# Patient Record
Sex: FEMALE | Race: BLACK OR AFRICAN AMERICAN | NOT HISPANIC OR LATINO
[De-identification: names, ages, dates, MRNs, and addresses within clinical notes are randomized per-mention and may not be internally consistent; named-entity substitution may affect disease eponyms.]

---

## 2018-05-08 ENCOUNTER — RESULT REVIEW (OUTPATIENT)
Age: 68
End: 2018-05-08

## 2018-05-08 ENCOUNTER — TRANSCRIPTION ENCOUNTER (OUTPATIENT)
Age: 68
End: 2018-05-08

## 2018-05-09 ENCOUNTER — INPATIENT (INPATIENT)
Facility: HOSPITAL | Age: 68
LOS: 8 days | Discharge: HOME | End: 2018-05-18
Attending: COLON & RECTAL SURGERY | Admitting: COLON & RECTAL SURGERY
Payer: MEDICARE

## 2018-05-09 VITALS
TEMPERATURE: 98 F | DIASTOLIC BLOOD PRESSURE: 72 MMHG | HEART RATE: 100 BPM | RESPIRATION RATE: 17 BRPM | OXYGEN SATURATION: 98 % | SYSTOLIC BLOOD PRESSURE: 112 MMHG

## 2018-05-09 DIAGNOSIS — I10 ESSENTIAL (PRIMARY) HYPERTENSION: ICD-10-CM

## 2018-05-09 DIAGNOSIS — Z87.59 PERSONAL HISTORY OF OTHER COMPLICATIONS OF PREGNANCY, CHILDBIRTH AND THE PUERPERIUM: Chronic | ICD-10-CM

## 2018-05-09 DIAGNOSIS — K56.609 UNSPECIFIED INTESTINAL OBSTRUCTION, UNSPECIFIED AS TO PARTIAL VERSUS COMPLETE OBSTRUCTION: ICD-10-CM

## 2018-05-09 DIAGNOSIS — E11.9 TYPE 2 DIABETES MELLITUS WITHOUT COMPLICATIONS: ICD-10-CM

## 2018-05-09 LAB
ALBUMIN SERPL ELPH-MCNC: 3.4 G/DL — SIGNIFICANT CHANGE UP (ref 3.3–5)
ALP SERPL-CCNC: 99 U/L — SIGNIFICANT CHANGE UP (ref 40–120)
ALT FLD-CCNC: 18 U/L — SIGNIFICANT CHANGE UP (ref 12–78)
ANION GAP SERPL CALC-SCNC: 19 MMOL/L — HIGH (ref 5–17)
APPEARANCE UR: CLEAR — SIGNIFICANT CHANGE UP
APTT BLD: 23.4 SEC — LOW (ref 27.5–37.4)
AST SERPL-CCNC: <3 U/L — LOW (ref 15–37)
BACTERIA # UR AUTO: ABNORMAL
BASOPHILS # BLD AUTO: 0 K/UL — SIGNIFICANT CHANGE UP (ref 0–0.2)
BASOPHILS NFR BLD AUTO: 0 % — SIGNIFICANT CHANGE UP (ref 0–2)
BILIRUB SERPL-MCNC: 1.2 MG/DL — SIGNIFICANT CHANGE UP (ref 0.2–1.2)
BILIRUB UR-MCNC: NEGATIVE — SIGNIFICANT CHANGE UP
BLD GP AB SCN SERPL QL: SIGNIFICANT CHANGE UP
BUN SERPL-MCNC: 36 MG/DL — HIGH (ref 7–23)
CALCIUM SERPL-MCNC: 10.7 MG/DL — HIGH (ref 8.5–10.1)
CHLORIDE SERPL-SCNC: 82 MMOL/L — LOW (ref 96–108)
CO2 SERPL-SCNC: 34 MMOL/L — HIGH (ref 22–31)
COLOR SPEC: YELLOW — SIGNIFICANT CHANGE UP
CREAT SERPL-MCNC: 2.67 MG/DL — HIGH (ref 0.5–1.3)
DIFF PNL FLD: ABNORMAL
EOSINOPHIL # BLD AUTO: 0 K/UL — SIGNIFICANT CHANGE UP (ref 0–0.5)
EOSINOPHIL NFR BLD AUTO: 0 % — SIGNIFICANT CHANGE UP (ref 0–6)
EPI CELLS # UR: SIGNIFICANT CHANGE UP
GLUCOSE BLDC GLUCOMTR-MCNC: 549 MG/DL — CRITICAL HIGH (ref 70–99)
GLUCOSE BLDC GLUCOMTR-MCNC: 555 MG/DL — CRITICAL HIGH (ref 70–99)
GLUCOSE SERPL-MCNC: 636 MG/DL — CRITICAL HIGH (ref 70–99)
GLUCOSE UR QL: 1000 MG/DL
HCT VFR BLD CALC: 46.2 % — HIGH (ref 34.5–45)
HGB BLD-MCNC: 15.6 G/DL — HIGH (ref 11.5–15.5)
INR BLD: 1.05 RATIO — SIGNIFICANT CHANGE UP (ref 0.88–1.16)
KETONES UR-MCNC: NEGATIVE — SIGNIFICANT CHANGE UP
LEUKOCYTE ESTERASE UR-ACNC: NEGATIVE — SIGNIFICANT CHANGE UP
LIDOCAIN IGE QN: 144 U/L — SIGNIFICANT CHANGE UP (ref 73–393)
LYMPHOCYTES # BLD AUTO: 0.8 K/UL — LOW (ref 1–3.3)
LYMPHOCYTES # BLD AUTO: 7 % — LOW (ref 13–44)
MAGNESIUM SERPL-MCNC: 3.2 MG/DL — HIGH (ref 1.6–2.6)
MANUAL SMEAR VERIFICATION: SIGNIFICANT CHANGE UP
MCHC RBC-ENTMCNC: 29.9 PG — SIGNIFICANT CHANGE UP (ref 27–34)
MCHC RBC-ENTMCNC: 33.8 GM/DL — SIGNIFICANT CHANGE UP (ref 32–36)
MCV RBC AUTO: 88.7 FL — SIGNIFICANT CHANGE UP (ref 80–100)
MONOCYTES # BLD AUTO: 1.7 K/UL — HIGH (ref 0–0.9)
MONOCYTES NFR BLD AUTO: 15 % — HIGH (ref 2–14)
NEUTROPHILS # BLD AUTO: 8.86 K/UL — HIGH (ref 1.8–7.4)
NEUTROPHILS NFR BLD AUTO: 73 % — SIGNIFICANT CHANGE UP (ref 43–77)
NEUTS BAND # BLD: 5 % — SIGNIFICANT CHANGE UP (ref 0–8)
NITRITE UR-MCNC: NEGATIVE — SIGNIFICANT CHANGE UP
NRBC # BLD: 0 /100 — SIGNIFICANT CHANGE UP (ref 0–0)
NRBC # BLD: SIGNIFICANT CHANGE UP /100 WBCS (ref 0–0)
PH UR: 5 — SIGNIFICANT CHANGE UP (ref 5–8)
PLAT MORPH BLD: NORMAL — SIGNIFICANT CHANGE UP
PLATELET # BLD AUTO: 319 K/UL — SIGNIFICANT CHANGE UP (ref 150–400)
POTASSIUM SERPL-MCNC: 2.6 MMOL/L — CRITICAL LOW (ref 3.5–5.3)
POTASSIUM SERPL-SCNC: 2.6 MMOL/L — CRITICAL LOW (ref 3.5–5.3)
PROT SERPL-MCNC: 8.2 GM/DL — SIGNIFICANT CHANGE UP (ref 6–8.3)
PROT UR-MCNC: NEGATIVE MG/DL — SIGNIFICANT CHANGE UP
PROTHROM AB SERPL-ACNC: 11.5 SEC — SIGNIFICANT CHANGE UP (ref 9.8–12.7)
RBC # BLD: 5.21 M/UL — HIGH (ref 3.8–5.2)
RBC # FLD: 13.5 % — SIGNIFICANT CHANGE UP (ref 10.3–14.5)
RBC BLD AUTO: SIGNIFICANT CHANGE UP
RBC CASTS # UR COMP ASSIST: ABNORMAL /HPF (ref 0–4)
SODIUM SERPL-SCNC: 135 MMOL/L — SIGNIFICANT CHANGE UP (ref 135–145)
SP GR SPEC: 1.01 — SIGNIFICANT CHANGE UP (ref 1.01–1.02)
TOXIC GRANULES BLD QL SMEAR: PRESENT — SIGNIFICANT CHANGE UP
UROBILINOGEN FLD QL: NEGATIVE MG/DL — SIGNIFICANT CHANGE UP
WBC # BLD: 11.36 K/UL — HIGH (ref 3.8–10.5)
WBC # FLD AUTO: 11.36 K/UL — HIGH (ref 3.8–10.5)
WBC UR QL: SIGNIFICANT CHANGE UP

## 2018-05-09 PROCEDURE — 88307 TISSUE EXAM BY PATHOLOGIST: CPT | Mod: 26

## 2018-05-09 PROCEDURE — 99291 CRITICAL CARE FIRST HOUR: CPT

## 2018-05-09 PROCEDURE — 74176 CT ABD & PELVIS W/O CONTRAST: CPT | Mod: 26

## 2018-05-09 PROCEDURE — 71045 X-RAY EXAM CHEST 1 VIEW: CPT | Mod: 26

## 2018-05-09 RX ORDER — PIPERACILLIN AND TAZOBACTAM 4; .5 G/20ML; G/20ML
3.38 INJECTION, POWDER, LYOPHILIZED, FOR SOLUTION INTRAVENOUS EVERY 8 HOURS
Qty: 0 | Refills: 0 | Status: DISCONTINUED | OUTPATIENT
Start: 2018-05-09 | End: 2018-05-11

## 2018-05-09 RX ORDER — ACETAMINOPHEN 500 MG
650 TABLET ORAL EVERY 6 HOURS
Qty: 0 | Refills: 0 | Status: DISCONTINUED | OUTPATIENT
Start: 2018-05-09 | End: 2018-05-10

## 2018-05-09 RX ORDER — SODIUM CHLORIDE 9 MG/ML
3000 INJECTION, SOLUTION INTRAVENOUS ONCE
Qty: 0 | Refills: 0 | Status: COMPLETED | OUTPATIENT
Start: 2018-05-09 | End: 2018-05-09

## 2018-05-09 RX ORDER — PIPERACILLIN AND TAZOBACTAM 4; .5 G/20ML; G/20ML
3.38 INJECTION, POWDER, LYOPHILIZED, FOR SOLUTION INTRAVENOUS ONCE
Qty: 0 | Refills: 0 | Status: COMPLETED | OUTPATIENT
Start: 2018-05-09 | End: 2018-05-09

## 2018-05-09 RX ORDER — ASPIRIN/CALCIUM CARB/MAGNESIUM 324 MG
81 TABLET ORAL DAILY
Qty: 0 | Refills: 0 | Status: DISCONTINUED | OUTPATIENT
Start: 2018-05-09 | End: 2018-05-10

## 2018-05-09 RX ORDER — DEXTROSE 50 % IN WATER 50 %
25 SYRINGE (ML) INTRAVENOUS ONCE
Qty: 0 | Refills: 0 | Status: DISCONTINUED | OUTPATIENT
Start: 2018-05-09 | End: 2018-05-11

## 2018-05-09 RX ORDER — IOHEXOL 300 MG/ML
30 INJECTION, SOLUTION INTRAVENOUS ONCE
Qty: 0 | Refills: 0 | Status: COMPLETED | OUTPATIENT
Start: 2018-05-09 | End: 2018-05-09

## 2018-05-09 RX ORDER — HYDROCHLOROTHIAZIDE 25 MG
25 TABLET ORAL DAILY
Qty: 0 | Refills: 0 | Status: DISCONTINUED | OUTPATIENT
Start: 2018-05-09 | End: 2018-05-10

## 2018-05-09 RX ORDER — DEXTROSE 50 % IN WATER 50 %
15 SYRINGE (ML) INTRAVENOUS ONCE
Qty: 0 | Refills: 0 | Status: DISCONTINUED | OUTPATIENT
Start: 2018-05-09 | End: 2018-05-11

## 2018-05-09 RX ORDER — INSULIN LISPRO 100/ML
VIAL (ML) SUBCUTANEOUS
Qty: 0 | Refills: 0 | Status: DISCONTINUED | OUTPATIENT
Start: 2018-05-09 | End: 2018-05-10

## 2018-05-09 RX ORDER — ONDANSETRON 8 MG/1
8 TABLET, FILM COATED ORAL ONCE
Qty: 0 | Refills: 0 | Status: COMPLETED | OUTPATIENT
Start: 2018-05-09 | End: 2018-05-09

## 2018-05-09 RX ORDER — MORPHINE SULFATE 50 MG/1
2 CAPSULE, EXTENDED RELEASE ORAL EVERY 4 HOURS
Qty: 0 | Refills: 0 | Status: DISCONTINUED | OUTPATIENT
Start: 2018-05-09 | End: 2018-05-10

## 2018-05-09 RX ORDER — INSULIN LISPRO 100/ML
VIAL (ML) SUBCUTANEOUS AT BEDTIME
Qty: 0 | Refills: 0 | Status: DISCONTINUED | OUTPATIENT
Start: 2018-05-09 | End: 2018-05-10

## 2018-05-09 RX ORDER — GLUCAGON INJECTION, SOLUTION 0.5 MG/.1ML
1 INJECTION, SOLUTION SUBCUTANEOUS ONCE
Qty: 0 | Refills: 0 | Status: DISCONTINUED | OUTPATIENT
Start: 2018-05-09 | End: 2018-05-11

## 2018-05-09 RX ORDER — MORPHINE SULFATE 50 MG/1
4 CAPSULE, EXTENDED RELEASE ORAL ONCE
Qty: 0 | Refills: 0 | Status: DISCONTINUED | OUTPATIENT
Start: 2018-05-09 | End: 2018-05-09

## 2018-05-09 RX ORDER — DEXTROSE 50 % IN WATER 50 %
12.5 SYRINGE (ML) INTRAVENOUS ONCE
Qty: 0 | Refills: 0 | Status: DISCONTINUED | OUTPATIENT
Start: 2018-05-09 | End: 2018-05-11

## 2018-05-09 RX ORDER — PANTOPRAZOLE SODIUM 20 MG/1
40 TABLET, DELAYED RELEASE ORAL DAILY
Qty: 0 | Refills: 0 | Status: DISCONTINUED | OUTPATIENT
Start: 2018-05-09 | End: 2018-05-11

## 2018-05-09 RX ORDER — LOSARTAN POTASSIUM 100 MG/1
100 TABLET, FILM COATED ORAL DAILY
Qty: 0 | Refills: 0 | Status: DISCONTINUED | OUTPATIENT
Start: 2018-05-09 | End: 2018-05-10

## 2018-05-09 RX ORDER — SODIUM CHLORIDE 9 MG/ML
1000 INJECTION INTRAMUSCULAR; INTRAVENOUS; SUBCUTANEOUS
Qty: 0 | Refills: 0 | Status: DISCONTINUED | OUTPATIENT
Start: 2018-05-09 | End: 2018-05-10

## 2018-05-09 RX ORDER — POTASSIUM CHLORIDE 20 MEQ
10 PACKET (EA) ORAL
Qty: 0 | Refills: 0 | Status: DISCONTINUED | OUTPATIENT
Start: 2018-05-09 | End: 2018-05-18

## 2018-05-09 RX ORDER — SODIUM CHLORIDE 9 MG/ML
1000 INJECTION, SOLUTION INTRAVENOUS
Qty: 0 | Refills: 0 | Status: DISCONTINUED | OUTPATIENT
Start: 2018-05-09 | End: 2018-05-11

## 2018-05-09 RX ORDER — HEPARIN SODIUM 5000 [USP'U]/ML
5000 INJECTION INTRAVENOUS; SUBCUTANEOUS EVERY 12 HOURS
Qty: 0 | Refills: 0 | Status: DISCONTINUED | OUTPATIENT
Start: 2018-05-09 | End: 2018-05-11

## 2018-05-09 RX ORDER — ONDANSETRON 8 MG/1
4 TABLET, FILM COATED ORAL EVERY 6 HOURS
Qty: 0 | Refills: 0 | Status: DISCONTINUED | OUTPATIENT
Start: 2018-05-09 | End: 2018-05-11

## 2018-05-09 RX ADMIN — PIPERACILLIN AND TAZOBACTAM 100 GRAM(S): 4; .5 INJECTION, POWDER, LYOPHILIZED, FOR SOLUTION INTRAVENOUS at 22:47

## 2018-05-09 RX ADMIN — Medication 100 MILLIEQUIVALENT(S): at 22:00

## 2018-05-09 RX ADMIN — ONDANSETRON 8 MILLIGRAM(S): 8 TABLET, FILM COATED ORAL at 20:28

## 2018-05-09 RX ADMIN — SODIUM CHLORIDE 3000 MILLILITER(S): 9 INJECTION, SOLUTION INTRAVENOUS at 22:16

## 2018-05-09 RX ADMIN — MORPHINE SULFATE 4 MILLIGRAM(S): 50 CAPSULE, EXTENDED RELEASE ORAL at 20:47

## 2018-05-09 RX ADMIN — IOHEXOL 30 MILLILITER(S): 300 INJECTION, SOLUTION INTRAVENOUS at 20:29

## 2018-05-09 RX ADMIN — MORPHINE SULFATE 4 MILLIGRAM(S): 50 CAPSULE, EXTENDED RELEASE ORAL at 20:29

## 2018-05-09 RX ADMIN — Medication 100 MILLIEQUIVALENT(S): at 23:14

## 2018-05-09 NOTE — H&P ADULT - NSHPPHYSICALEXAM_GEN_ALL_CORE
PHYSICAL EXAM:  GENERAL: NAD, well-developed  HEAD:  Atraumatic, Normocephalic  EYES: conjunctiva and sclera clear  ENMT: No tonsillar erythema, exudates, or enlargement; Moist mucous membranes, No lesions  NECK: Supple, No JVD, Normal thyroid  NERVOUS SYSTEM:  Alert & Oriented X3  CHEST/LUNG: Clear to auscultation bilaterally; No rales, rhonchi, wheezing  HEART: Regular rate and rhythm. S1S2  ABDOMEN: very distended, soft, hypoactive BS, diffuse tenderness  EXTREMITIES:  2+ Peripheral Pulses, No clubbing, cyanosis, or edema

## 2018-05-09 NOTE — H&P ADULT - HISTORY OF PRESENT ILLNESS
68 y/o female PMHx HTN, DM, HLD,  x 2 (32 and 27 years ago), DVT  c/o abdominal pain and vomiting x 3 days. Pain is most severe periumbilical. Multiple episodes of emesis. Last BM yesterday and normal. Passed flatus today. Patient denies fever, chills, constipation, diarrhea, hematuria, melena, hematochezia, chest pain, shortness of breath, dizziness. Patient denies prior incident.

## 2018-05-09 NOTE — H&P ADULT - PROBLEM SELECTOR PLAN 1
-Admit patient   -Emergent OR   -Pre-op labs  -NPO, NGT to LWS, IV hydration  -KUB to confirm NGT placement  -sanchez  -pain management prn  -anti-emetic prn  -GI ppx  -DVT ppx  -f/u labs  -discussed with Dr. Sequeira

## 2018-05-09 NOTE — H&P ADULT - NSHPLABSRESULTS_GEN_ALL_CORE
15.6   11.36 )-----------( 319      ( 09 May 2018 20:40 )             46.2   05-09    135  |  82<L>  |  36<H>  ----------------------------<  636<HH>  2.6<LL>   |  34<H>  |  2.67<H>    Ca    10.7<H>      09 May 2018 20:40  Mg     3.2     05-09    TPro  8.2  /  Alb  3.4  /  TBili  1.2  /  DBili  x   /  AST  <3<L>  /  ALT  18  /  AlkPhos  99  05-09    CT Abdomen and Pelvis w/ Oral Cont (05.09.18 @ 21:33)   IMPRESSION:  High-grade SBO with ischemic-looking distal small bowel loops in the   pelvis. Pneumatosis and portal venous gas confirm ischemia. No large   amount of free air. Small volume ascites. Urgent surgical consultation is   recommended.

## 2018-05-09 NOTE — ED PROVIDER NOTE - PHYSICAL EXAMINATION
Gen: Alert, NAD  Head: NC, AT   Eyes: PERRL, EOMI, normal lids/conjunctiva  ENT: normal hearing, patent oropharynx without erythema/exudate, uvula midline  Neck: supple, no tenderness, Trachea midline  Pulm: Bilateral BS, normal resp effort, no wheeze/stridor/retractions  CV: RRR, no M/R/G, 2+ radial and dp pulses bl, no edema  Abd: distended, ttp diffusely, +BS, no hepatosplenomegaly  Mskel: extremities x4 with normal ROM and no joint effusions. no ctl spine ttp.   Skin: no rash, no bruising   Neuro: AAOx3, no sensory/motor deficits, CN 2-12 intact

## 2018-05-09 NOTE — ED ADULT NURSE REASSESSMENT NOTE - NS ED NURSE REASSESS COMMENT FT1
Pt CMP drawn to confirm finger sticks that were logged in the EMR. Confirmatory specimen drawn and sent to the lab for evaluation.
Report taken from MARLA Fonseca, stated report was given to the OR and pt information was completed for transfer. Upon completion of report, the OR arrived for transport and pt report was given to [facility]. Pt transported to the floor by OR transport tech. VS recorded and surgical checklist completed. Pt transported off the floor at 2343 hours. Pt does not appear comfortable, reports a 4/10 pain and shows no signs or symptoms of distress. Pts family took all of the patients belongings.
pt awaiting to go to OR, pt and family made aware, pt NST placed by Dr. Chandler pt tolerated well. pt reports last eaten at 1500 today.

## 2018-05-09 NOTE — H&P ADULT - ASSESSMENT
66 y/o female PMHx HTN, DM, HLD,  x 2 (32 and 27 years ago), DVT 1986 c/o abdominal pain and vomiting x 3 days. Admitted with SBO, concern for ischemia.

## 2018-05-09 NOTE — ED PROVIDER NOTE - OBJECTIVE STATEMENT
Pertinent PMH/PSH/FHx/SHx and Review of Systems contained within:  67F hx htn dm hld pw 2 days abd pain, nausea, vomiting. patient notes drinking etoh 2 days ago, which she normally does not. but it was not a lot. no associated fever, dysuria. patient is passing gas and had a bm yesterday. prev surgery includes c section. patient has no cp, sob, ha, vision change, rash, bleeding. patient did not take anything for symptoms  Fh and Sh not otherwise contributory  ROS otherwise negative

## 2018-05-09 NOTE — H&P ADULT - ATTENDING COMMENTS
I have seen and examined the patient. I agree with the above surgery resident's note.  a/p high grade sbo with s/s of intestinal ishemia  -OR for emergent ex lap, possible bowel resection/stoma  -r/b/c explained to pt and family including possible need for 2nd look

## 2018-05-09 NOTE — ED PROVIDER NOTE - MEDICAL DECISION MAKING DETAILS
patient abd abd pain and distension, suspect sbo patient abd abd pain and distension, suspect sbo. CT shows high grade with sbo and ishcemic bowel. patient to go to OR tonight with dr nugent. NG tube placed. nsr rate 100bpm, no st elevation or depression, no axis deviation, no t inversion, qtc and pr wnl. patient abd abd pain and distension, suspect sbo. CT shows high grade with sbo and ishcemic bowel. patient to go to OR tonight with dr nugent. NG tube placed. nsr rate 100bpm, no st elevation or depression, no axis deviation, no t inversion, qtc and pr wnl. labs notably show hyperglycemia and hypokalemia. will bolus LR and given potassium riders. At this stage, would be dangerous to given insulin given how low the potassium is. Will need K to be in the 4s before insulin is given.

## 2018-05-09 NOTE — ED PROVIDER NOTE - CARE PLAN
Principal Discharge DX:	Small bowel obstruction  Secondary Diagnosis:	Hyperglycemia  Secondary Diagnosis:	Hypokalemia

## 2018-05-10 ENCOUNTER — TRANSCRIPTION ENCOUNTER (OUTPATIENT)
Age: 68
End: 2018-05-10

## 2018-05-10 DIAGNOSIS — I10 ESSENTIAL (PRIMARY) HYPERTENSION: ICD-10-CM

## 2018-05-10 LAB
ALBUMIN SERPL ELPH-MCNC: 2.4 G/DL — LOW (ref 3.3–5)
ALBUMIN SERPL ELPH-MCNC: 2.5 G/DL — LOW (ref 3.3–5)
ALP SERPL-CCNC: 70 U/L — SIGNIFICANT CHANGE UP (ref 40–120)
ALP SERPL-CCNC: 73 U/L — SIGNIFICANT CHANGE UP (ref 40–120)
ALT FLD-CCNC: 13 U/L — SIGNIFICANT CHANGE UP (ref 12–78)
ALT FLD-CCNC: 14 U/L — SIGNIFICANT CHANGE UP (ref 12–78)
ANION GAP SERPL CALC-SCNC: 12 MMOL/L — SIGNIFICANT CHANGE UP (ref 5–17)
ANION GAP SERPL CALC-SCNC: 14 MMOL/L — SIGNIFICANT CHANGE UP (ref 5–17)
APTT BLD: 23.6 SEC — LOW (ref 27.5–37.4)
AST SERPL-CCNC: 11 U/L — LOW (ref 15–37)
AST SERPL-CCNC: 22 U/L — SIGNIFICANT CHANGE UP (ref 15–37)
BASE EXCESS BLDA CALC-SCNC: 11.3 MMOL/L — HIGH (ref -2–2)
BILIRUB SERPL-MCNC: 0.9 MG/DL — SIGNIFICANT CHANGE UP (ref 0.2–1.2)
BILIRUB SERPL-MCNC: 1.1 MG/DL — SIGNIFICANT CHANGE UP (ref 0.2–1.2)
BLOOD GAS COMMENTS: SIGNIFICANT CHANGE UP
BLOOD GAS SOURCE: SIGNIFICANT CHANGE UP
BUN SERPL-MCNC: 31 MG/DL — HIGH (ref 7–23)
BUN SERPL-MCNC: 35 MG/DL — HIGH (ref 7–23)
CALCIUM SERPL-MCNC: 9.6 MG/DL — SIGNIFICANT CHANGE UP (ref 8.5–10.1)
CALCIUM SERPL-MCNC: 9.6 MG/DL — SIGNIFICANT CHANGE UP (ref 8.5–10.1)
CHLORIDE SERPL-SCNC: 88 MMOL/L — LOW (ref 96–108)
CHLORIDE SERPL-SCNC: 92 MMOL/L — LOW (ref 96–108)
CO2 SERPL-SCNC: 32 MMOL/L — HIGH (ref 22–31)
CO2 SERPL-SCNC: 38 MMOL/L — HIGH (ref 22–31)
CREAT SERPL-MCNC: 1.85 MG/DL — HIGH (ref 0.5–1.3)
CREAT SERPL-MCNC: 1.97 MG/DL — HIGH (ref 0.5–1.3)
GLUCOSE BLDC GLUCOMTR-MCNC: 155 MG/DL — HIGH (ref 70–99)
GLUCOSE BLDC GLUCOMTR-MCNC: 172 MG/DL — HIGH (ref 70–99)
GLUCOSE BLDC GLUCOMTR-MCNC: 191 MG/DL — HIGH (ref 70–99)
GLUCOSE BLDC GLUCOMTR-MCNC: 218 MG/DL — HIGH (ref 70–99)
GLUCOSE BLDC GLUCOMTR-MCNC: 342 MG/DL — HIGH (ref 70–99)
GLUCOSE BLDC GLUCOMTR-MCNC: 451 MG/DL — CRITICAL HIGH (ref 70–99)
GLUCOSE BLDC GLUCOMTR-MCNC: 452 MG/DL — CRITICAL HIGH (ref 70–99)
GLUCOSE BLDC GLUCOMTR-MCNC: 454 MG/DL — CRITICAL HIGH (ref 70–99)
GLUCOSE SERPL-MCNC: 431 MG/DL — HIGH (ref 70–99)
GLUCOSE SERPL-MCNC: 556 MG/DL — CRITICAL HIGH (ref 70–99)
HBA1C BLD-MCNC: 9.8 % — HIGH (ref 4–5.6)
HCO3 BLDA-SCNC: 35 MMOL/L — HIGH (ref 21–29)
HCT VFR BLD CALC: 46.7 % — HIGH (ref 34.5–45)
HGB BLD-MCNC: 15.7 G/DL — HIGH (ref 11.5–15.5)
HOROWITZ INDEX BLDA+IHG-RTO: 90 — SIGNIFICANT CHANGE UP
INR BLD: 1.04 RATIO — SIGNIFICANT CHANGE UP (ref 0.88–1.16)
MAGNESIUM SERPL-MCNC: 3 MG/DL — HIGH (ref 1.6–2.6)
MCHC RBC-ENTMCNC: 30.1 PG — SIGNIFICANT CHANGE UP (ref 27–34)
MCHC RBC-ENTMCNC: 33.6 GM/DL — SIGNIFICANT CHANGE UP (ref 32–36)
MCV RBC AUTO: 89.6 FL — SIGNIFICANT CHANGE UP (ref 80–100)
NRBC # BLD: 0 /100 WBCS — SIGNIFICANT CHANGE UP (ref 0–0)
PCO2 BLDA: 43 MMHG — SIGNIFICANT CHANGE UP (ref 32–46)
PH BLD: 7.53 — HIGH (ref 7.35–7.45)
PHOSPHATE SERPL-MCNC: 3.6 MG/DL — SIGNIFICANT CHANGE UP (ref 2.5–4.5)
PLATELET # BLD AUTO: 227 K/UL — SIGNIFICANT CHANGE UP (ref 150–400)
PO2 BLDA: 368 MMHG — HIGH (ref 74–108)
POTASSIUM SERPL-MCNC: 3.3 MMOL/L — LOW (ref 3.5–5.3)
POTASSIUM SERPL-MCNC: 3.4 MMOL/L — LOW (ref 3.5–5.3)
POTASSIUM SERPL-SCNC: 3.3 MMOL/L — LOW (ref 3.5–5.3)
POTASSIUM SERPL-SCNC: 3.4 MMOL/L — LOW (ref 3.5–5.3)
PROT SERPL-MCNC: 6.2 GM/DL — SIGNIFICANT CHANGE UP (ref 6–8.3)
PROT SERPL-MCNC: 6.2 GM/DL — SIGNIFICANT CHANGE UP (ref 6–8.3)
PROTHROM AB SERPL-ACNC: 11.4 SEC — SIGNIFICANT CHANGE UP (ref 9.8–12.7)
RBC # BLD: 5.21 M/UL — HIGH (ref 3.8–5.2)
RBC # FLD: 13.4 % — SIGNIFICANT CHANGE UP (ref 10.3–14.5)
SAO2 % BLDA: 99 % — HIGH (ref 92–96)
SODIUM SERPL-SCNC: 138 MMOL/L — SIGNIFICANT CHANGE UP (ref 135–145)
SODIUM SERPL-SCNC: 138 MMOL/L — SIGNIFICANT CHANGE UP (ref 135–145)
WBC # BLD: 7.7 K/UL — SIGNIFICANT CHANGE UP (ref 3.8–10.5)
WBC # FLD AUTO: 7.7 K/UL — SIGNIFICANT CHANGE UP (ref 3.8–10.5)

## 2018-05-10 PROCEDURE — 93010 ELECTROCARDIOGRAM REPORT: CPT

## 2018-05-10 PROCEDURE — 71045 X-RAY EXAM CHEST 1 VIEW: CPT | Mod: 26

## 2018-05-10 PROCEDURE — 44120 REMOVAL OF SMALL INTESTINE: CPT | Mod: AS

## 2018-05-10 RX ORDER — INSULIN LISPRO 100/ML
VIAL (ML) SUBCUTANEOUS EVERY 4 HOURS
Qty: 0 | Refills: 0 | Status: DISCONTINUED | OUTPATIENT
Start: 2018-05-10 | End: 2018-05-10

## 2018-05-10 RX ORDER — INSULIN HUMAN 100 [IU]/ML
8 INJECTION, SOLUTION SUBCUTANEOUS
Qty: 100 | Refills: 0 | Status: DISCONTINUED | OUTPATIENT
Start: 2018-05-10 | End: 2018-05-11

## 2018-05-10 RX ORDER — POTASSIUM CHLORIDE 20 MEQ
10 PACKET (EA) ORAL ONCE
Qty: 0 | Refills: 0 | Status: COMPLETED | OUTPATIENT
Start: 2018-05-10 | End: 2018-05-10

## 2018-05-10 RX ORDER — SODIUM CHLORIDE 9 MG/ML
1000 INJECTION, SOLUTION INTRAVENOUS
Qty: 0 | Refills: 0 | Status: DISCONTINUED | OUTPATIENT
Start: 2018-05-10 | End: 2018-05-11

## 2018-05-10 RX ORDER — INSULIN GLARGINE 100 [IU]/ML
10 INJECTION, SOLUTION SUBCUTANEOUS ONCE
Qty: 0 | Refills: 0 | Status: COMPLETED | OUTPATIENT
Start: 2018-05-10 | End: 2018-05-10

## 2018-05-10 RX ORDER — NOREPINEPHRINE BITARTRATE/D5W 8 MG/250ML
0.05 PLASTIC BAG, INJECTION (ML) INTRAVENOUS
Qty: 8 | Refills: 0 | Status: DISCONTINUED | OUTPATIENT
Start: 2018-05-10 | End: 2018-05-11

## 2018-05-10 RX ORDER — SODIUM CHLORIDE 9 MG/ML
1000 INJECTION, SOLUTION INTRAVENOUS
Qty: 0 | Refills: 0 | Status: DISCONTINUED | OUTPATIENT
Start: 2018-05-10 | End: 2018-05-10

## 2018-05-10 RX ORDER — FENTANYL CITRATE 50 UG/ML
50 INJECTION INTRAVENOUS ONCE
Qty: 0 | Refills: 0 | Status: DISCONTINUED | OUTPATIENT
Start: 2018-05-10 | End: 2018-05-10

## 2018-05-10 RX ORDER — PROPOFOL 10 MG/ML
5 INJECTION, EMULSION INTRAVENOUS
Qty: 1000 | Refills: 0 | Status: DISCONTINUED | OUTPATIENT
Start: 2018-05-10 | End: 2018-05-11

## 2018-05-10 RX ORDER — SODIUM CHLORIDE 9 MG/ML
1000 INJECTION, SOLUTION INTRAVENOUS ONCE
Qty: 0 | Refills: 0 | Status: COMPLETED | OUTPATIENT
Start: 2018-05-10 | End: 2018-05-10

## 2018-05-10 RX ORDER — INSULIN LISPRO 100/ML
VIAL (ML) SUBCUTANEOUS EVERY 6 HOURS
Qty: 0 | Refills: 0 | Status: DISCONTINUED | OUTPATIENT
Start: 2018-05-10 | End: 2018-05-10

## 2018-05-10 RX ORDER — FENTANYL CITRATE 50 UG/ML
0.5 INJECTION INTRAVENOUS
Qty: 2500 | Refills: 0 | Status: DISCONTINUED | OUTPATIENT
Start: 2018-05-10 | End: 2018-05-11

## 2018-05-10 RX ADMIN — PANTOPRAZOLE SODIUM 40 MILLIGRAM(S): 20 TABLET, DELAYED RELEASE ORAL at 12:55

## 2018-05-10 RX ADMIN — HEPARIN SODIUM 5000 UNIT(S): 5000 INJECTION INTRAVENOUS; SUBCUTANEOUS at 06:32

## 2018-05-10 RX ADMIN — Medication 100 MILLIEQUIVALENT(S): at 08:15

## 2018-05-10 RX ADMIN — SODIUM CHLORIDE 1000 MILLILITER(S): 9 INJECTION, SOLUTION INTRAVENOUS at 03:19

## 2018-05-10 RX ADMIN — PIPERACILLIN AND TAZOBACTAM 25 GRAM(S): 4; .5 INJECTION, POWDER, LYOPHILIZED, FOR SOLUTION INTRAVENOUS at 06:32

## 2018-05-10 RX ADMIN — FENTANYL CITRATE 50 MICROGRAM(S): 50 INJECTION INTRAVENOUS at 03:20

## 2018-05-10 RX ADMIN — PROPOFOL 2.07 MICROGRAM(S)/KG/MIN: 10 INJECTION, EMULSION INTRAVENOUS at 04:24

## 2018-05-10 RX ADMIN — FENTANYL CITRATE 1.72 MICROGRAM(S)/KG/HR: 50 INJECTION INTRAVENOUS at 04:24

## 2018-05-10 RX ADMIN — Medication 6.46 MICROGRAM(S)/KG/MIN: at 04:23

## 2018-05-10 RX ADMIN — PIPERACILLIN AND TAZOBACTAM 25 GRAM(S): 4; .5 INJECTION, POWDER, LYOPHILIZED, FOR SOLUTION INTRAVENOUS at 13:19

## 2018-05-10 RX ADMIN — PROPOFOL 2.07 MICROGRAM(S)/KG/MIN: 10 INJECTION, EMULSION INTRAVENOUS at 13:46

## 2018-05-10 RX ADMIN — Medication 6: at 06:32

## 2018-05-10 RX ADMIN — Medication 6.46 MICROGRAM(S)/KG/MIN: at 15:33

## 2018-05-10 RX ADMIN — INSULIN GLARGINE 10 UNIT(S): 100 INJECTION, SOLUTION SUBCUTANEOUS at 04:08

## 2018-05-10 RX ADMIN — PROPOFOL 2.07 MICROGRAM(S)/KG/MIN: 10 INJECTION, EMULSION INTRAVENOUS at 03:19

## 2018-05-10 RX ADMIN — INSULIN HUMAN 8 UNIT(S)/HR: 100 INJECTION, SOLUTION SUBCUTANEOUS at 12:36

## 2018-05-10 RX ADMIN — SODIUM CHLORIDE 100 MILLILITER(S): 9 INJECTION, SOLUTION INTRAVENOUS at 21:20

## 2018-05-10 RX ADMIN — SODIUM CHLORIDE 100 MILLILITER(S): 9 INJECTION, SOLUTION INTRAVENOUS at 15:29

## 2018-05-10 RX ADMIN — PIPERACILLIN AND TAZOBACTAM 25 GRAM(S): 4; .5 INJECTION, POWDER, LYOPHILIZED, FOR SOLUTION INTRAVENOUS at 21:20

## 2018-05-10 NOTE — PROGRESS NOTE ADULT - SUBJECTIVE AND OBJECTIVE BOX
INTERVAL HPI/OVERNIGHT EVENTS:      66 y/o female PMHx HTN, DM, HLD,  x 2 (32 and 27 years ago), DVT  c/o abdominal pain and vomiting x 3 days found to hae small bowel obstruction now s/p OR with ex-lap and sbr left in discontinuity with abthera vac.      24 hour events: Hyperglycemic overnight; started on insulin drip.      CENTRAL LINE: [ ] YES [x ] NO  LOCATION:   DATE INSERTED:  REMOVE: [ ] YES [ ] NO  EXPLAIN:    ROSALES: [x ] YES [ ] NO    DATE INSERTED:  REMOVE:  [ ] YES [ ] NO  EXPLAIN:    A-LINE:  [ ] YES [x ] NO  LOCATION:   DATE INSERTED:  REMOVE:  [ ] YES [ ] NO  EXPLAIN:    REVIEW OF SYSTEMS: [x] Unable to obtain because intubated and sedated; responds to simple commands.        ICU Vital Signs Last 24 Hrs  T(C): 37.1 (10 May 2018 15:42), Max: 37.3 (10 May 2018 13:00)  T(F): 98.8 (10 May 2018 15:42), Max: 99.1 (10 May 2018 13:00)  HR: 68 (10 May 2018 17:00) (68 - 111)  BP: 89/59 (10 May 2018 16:30) (66/44 - 130/109)  BP(mean): 69 (10 May 2018 16:30) (53 - 116)  ABP: --  ABP(mean): --  RR: 14 (10 May 2018 17:00) (12 - 18)  SpO2: 100% (10 May 2018 17:00) (93% - 100%)      ABG - ( 10 May 2018 02:46 )  pH, Arterial: x     pH, Blood: 7.53  /  pCO2: 43    /  pO2: 368   / HCO3: 35    / Base Excess: 11.3  /  SaO2: 99                  I&O's Detail    09 May 2018 07:01  -  10 May 2018 07:00  --------------------------------------------------------  IN:    fentaNYL Infusion.: 7.6 mL    lactated ringers.: 100 mL    norepinephrine Infusion: 12.9 mL    propofol Infusion: 6.2 mL  Total IN: 126.7 mL    OUT:    Voided: 30 mL  Total OUT: 30 mL    Total NET: 96.7 mL      10 May 2018 07:01  -  10 May 2018 18:06  --------------------------------------------------------  IN:    fentaNYL Infusion.: 68.9 mL    insulin Infusion: 40 mL    IV PiggyBack: 200 mL    lactated ringers.: 1000 mL    norepinephrine Infusion: 212.8 mL    propofol Infusion: 77.2 mL  Total IN: 1598.9 mL    OUT:    Indwelling Catheter - Urethral: 350 mL    VAC (Vacuum Assisted Closure) System: 480 mL  Total OUT: 830 mL    Total NET: 768.9 mL          Mode: AC/ CMV (Assist Control/ Continuous Mandatory Ventilation)  RR (machine): 12  TV (machine): 450  FiO2: 30  PEEP: 5  ITime: 1  MAP: 8  PIP: 20    CAPILLARY BLOOD GLUCOSE      POCT Blood Glucose.: 218 mg/dL (10 May 2018 16:54)  POCT Blood Glucose.: 342 mg/dL (10 May 2018 14:34)  POCT Blood Glucose.: 451 mg/dL (10 May 2018 06:30)  POCT Blood Glucose.: 452 mg/dL (10 May 2018 04:06)  POCT Blood Glucose.: 454 mg/dL (10 May 2018 02:25)  POCT Blood Glucose.: 555 mg/dL (09 May 2018 23:14)  POCT Blood Glucose.: 549 mg/dL (09 May 2018 23:13)      MEDICATIONS  NEURO  Meds: fentaNYL   Infusion. 0.5 MICROgram(s)/kG/Hr (1.723 mL/Hr) IV Continuous <Continuous>  ondansetron Injectable 4 milliGRAM(s) IV Push every 6 hours PRN Nausea  propofol Infusion 5 MICROgram(s)/kG/Min (2.067 mL/Hr) IV Continuous <Continuous>    RESPIRATORY  ABG - ( 10 May 2018 02:46 )  pH: x     /  pCO2: 43    /  pO2: 368   / HCO3: 35    / Base Excess: 11.3  /  SaO2: 99      Lactate: x                Meds:   CARDIOVASCULAR  Meds: norepinephrine Infusion 0.05 MICROgram(s)/kG/Min (6.459 mL/Hr) IV Continuous <Continuous>    GI/NUTRITION  Meds: pantoprazole  Injectable 40 milliGRAM(s) IV Push daily    GENITOURINARY  Meds: dextrose 5%. 1000 milliLiter(s) IV Continuous <Continuous>  lactated ringers. 1000 milliLiter(s) IV Continuous <Continuous>  potassium chloride  10 mEq/100 mL IVPB 10 milliEquivalent(s) IV Intermittent every 1 hour    HEMATOLOGIC  Meds: heparin  Injectable 5000 Unit(s) SubCutaneous every 12 hours    [x] VTE Prophylaxis  INFECTIOUS DISEASES  Meds: piperacillin/tazobactam IVPB. 3.375 Gram(s) IV Intermittent every 8 hours    ENDOCRINE  CAPILLARY BLOOD GLUCOSE      POCT Blood Glucose.: 218 mg/dL (10 May 2018 16:54)  POCT Blood Glucose.: 342 mg/dL (10 May 2018 14:34)  POCT Blood Glucose.: 451 mg/dL (10 May 2018 06:30)  POCT Blood Glucose.: 452 mg/dL (10 May 2018 04:06)  POCT Blood Glucose.: 454 mg/dL (10 May 2018 02:25)  POCT Blood Glucose.: 555 mg/dL (09 May 2018 23:14)  POCT Blood Glucose.: 549 mg/dL (09 May 2018 23:13)    Meds: dextrose 40% Gel 15 Gram(s) Oral once PRN  dextrose 50% Injectable 12.5 Gram(s) IV Push once  dextrose 50% Injectable 25 Gram(s) IV Push once  dextrose 50% Injectable 25 Gram(s) IV Push once  glucagon  Injectable 1 milliGRAM(s) IntraMuscular once PRN  insulin Infusion 8 Unit(s)/Hr IV Continuous <Continuous>    OTHER MEDICATIONS:  :    PHYSICAL EXAM:    GENERAL: NAD, well-groomed, well-developed; intubated and sedated  NECK: Supple, No JVD, Normal thyroid  CHEST/LUNG: Clear to auscultation bilaterally; No rales, rhonchi, wheezing, or rubs  HEART: Regular rate and rhythm; No murmurs, rubs, or gallops  ABDOMEN: Soft, Nontender, vac in place.    EXTREMITIES:  2+ Peripheral Pulses, No clubbing, cyanosis, or edema  SKIN: No rashes or lesions  NERVOUS SYSTEM:  Alert & Oriented X3, following simple commands.      LABS:                        15.7   7.70  )-----------( 227      ( 10 May 2018 04:30 )             46.7      05-10    138  |  92<L>  |  31<H>  ----------------------------<  431<H>  3.4<L>   |  32<H>  |  1.85<H>    Ca    9.6      10 May 2018 04:30  Phos  3.6     05-10  Mg     3.0     05-10    TPro  6.2  /  Alb  2.4<L>  /  TBili  0.9  /  DBili  x   /  AST  22  /  ALT  14  /  AlkPhos  73  05-10    PT/INR - ( 10 May 2018 04:24 )   PT: 11.4 sec;   INR: 1.04 ratio         PTT - ( 10 May 2018 04:24 )  PTT:23.6 sec  Urinalysis Basic - ( 09 May 2018 20:40 )    Color: Yellow / Appearance: Clear / S.010 / pH: x  Gluc: x / Ketone: Negative  / Bili: Negative / Urobili: Negative mg/dL   Blood: x / Protein: Negative mg/dL / Nitrite: Negative   Leuk Esterase: Negative / RBC: 3-5 /HPF / WBC 0-2   Sq Epi: x / Non Sq Epi: Few / Bacteria: Occasional

## 2018-05-10 NOTE — PROVIDER CONTACT NOTE (EICU) - RECOMMENDATIONS
-- vent management and weaning  -- hemodynamic support, continue fluid resuscitation.   -- glucose management consider insulin gtt  -- antibiotics for GI coverage  -- ICU monitoring.    Case discussed with PA.

## 2018-05-10 NOTE — PROVIDER CONTACT NOTE (EICU) - ASSESSMENT
Problems  # s/p Exlap, lysis of adhesion small bowel resection  - SBO/small bowel ischemia   # Hyperglycemia  # post op resp failure

## 2018-05-10 NOTE — BRIEF OPERATIVE NOTE - PROCEDURE
<<-----Click on this checkbox to enter Procedure Vacuum assisted closure therapy  05/10/2018    Active  RDRING  Small bowel resection  05/10/2018    Active  RDRING  Lysis of adhesions for small bowel obstruction  05/10/2018    Active  RDRING

## 2018-05-10 NOTE — PROVIDER CONTACT NOTE (EICU) - BACKGROUND
66yo admitted with abdominal pain and distention on CT found to have high grade SBO.  Went to OR emergently for Ex lap - lysis of adhesion.  Intra-op found to have closed loop adhesion SBO with ischemic bowel s/p resection.  minimal blood loss, given approximately 5 Liters of IVF.  Of note pt had several lab dergangements including VJ, hyperglycemia with glucosuria on presentation.

## 2018-05-10 NOTE — CONSULT NOTE ADULT - ASSESSMENT
66 Y/O female admitted w/  abdominal pain, vomiting, s/p ex lap, remained intubated post operatively, transferred to ICU for further management.

## 2018-05-10 NOTE — CONSULT NOTE ADULT - PROBLEM SELECTOR RECOMMENDATION 9
1. transfer to ICU under Dr. Scott  2. ABG, cxr, make vent changes accordingly  3. repeat labs, replace electrolytes as needed  4. maintain wound vac as per surgical recommendations   5. monitor H/H  6. sedation vacation as tolerated

## 2018-05-10 NOTE — CONSULT NOTE ADULT - SUBJECTIVE AND OBJECTIVE BOX
Patient is a 67y old  Female who presents with a chief complaint of Abdominal pain, vomiting, s/p ex lap, remained intubated post operatively, transferred to ICU for further management.     HPI:  66 y/o female PMHx HTN, DM, HLD,  x 2 (32 and 27 years ago), DVT 1986 c/o abdominal pain and vomiting x 3 days. Pain is most severe periumbilical. Multiple episodes of emesis. Last BM yesterday and normal. Passed flatus today. Patient denies fever, chills, constipation, diarrhea, hematuria, melena, hematochezia, chest pain, shortness of breath, dizziness. Patient denies prior incident. (09 May 2018 22:59)      Allergies    avocado (Unknown)  No Known Drug Allergies    MEDICATIONS  (STANDING):  aspirin  chewable 81 milliGRAM(s) Oral daily  dextrose 5%. 1000 milliLiter(s) (50 mL/Hr) IV Continuous <Continuous>  dextrose 50% Injectable 12.5 Gram(s) IV Push once  dextrose 50% Injectable 25 Gram(s) IV Push once  dextrose 50% Injectable 25 Gram(s) IV Push once  heparin  Injectable 5000 Unit(s) SubCutaneous every 12 hours  insulin lispro (HumaLOG) corrective regimen sliding scale   SubCutaneous every 6 hours  lactated ringers. 1000 milliLiter(s) (100 mL/Hr) IV Continuous <Continuous>  pantoprazole  Injectable 40 milliGRAM(s) IV Push daily  piperacillin/tazobactam IVPB. 3.375 Gram(s) IV Intermittent every 8 hours  potassium chloride  10 mEq/100 mL IVPB 10 milliEquivalent(s) IV Intermittent every 1 hour  propofol Infusion 5 MICROgram(s)/kG/Min (2.067 mL/Hr) IV Continuous <Continuous>    MEDICATIONS  (PRN):  dextrose 40% Gel 15 Gram(s) Oral once PRN Blood Glucose LESS THAN 70 milliGRAM(s)/deciliter  glucagon  Injectable 1 milliGRAM(s) IntraMuscular once PRN Glucose LESS THAN 70 milligrams/deciliter  morphine  - Injectable 2 milliGRAM(s) IV Push every 4 hours PRN Severe Pain (7 - 10)  ondansetron Injectable 4 milliGRAM(s) IV Push every 6 hours PRN Nausea      Daily Height in cm: 160.02 (09 May 2018 23:34)    Daily     Drug Dosing Weight  Height (cm): 160.02 (09 May 2018 23:34)  Weight (kg): 68.9 (09 May 2018 23:34)  BMI (kg/m2): 26.9 (09 May 2018 23:34)  BSA (m2): 1.72 (09 May 2018 23:34)    PAST MEDICAL & SURGICAL HISTORY:  DM type 2 (diabetes mellitus, type 2)  HTN (hypertension)  History of 2  sections      FAMILY HISTORY:  No pertinent family history in first degree relatives      SOCIAL HISTORY:    ADVANCE DIRECTIVES: [ ] Full Code [ ] DNR    REVIEW OF SYSTEMS:  Pt unable to provide 2/2 to intubation and sedation      ICU Vital Signs Last 24 Hrs  T(C): 36.7 (09 May 2018 23:34), Max: 36.9 (09 May 2018 20:11)  T(F): 98.1 (09 May 2018 23:34), Max: 98.4 (09 May 2018 20:11)  HR: 85 (09 May 2018 23:34) (85 - 111)  BP: 130/54 (09 May 2018 23:34) (112/72 - 130/54)  BP(mean): --  ABP: --  ABP(mean): --  RR: 17 (09 May 2018 23:34) (17 - 18)  SpO2: 97% (09 May 2018 23:34) (93% - 98%)      PHYSICAL EXAM:    GENERAL: intubated and sedted  HEAD:  Atraumatic, Normocephalic  EYES: EOMI, PERRLA, conjunctiva and sclera clear  NECK: Supple, No JVD, Normal thyroid  NERVOUS SYSTEM:  sedated, responds to painful stimuly  CHEST/LUNG: + breath sounds bilaterally; No rales, rhonchi, wheezing  HEART: Regular rate and rhythm; No murmurs, rubs, or gallops  ABDOMEN: Soft, + wound vac, obese, bowel sounds absent  EXTREMITIES:  2+ Peripheral Pulses, No clubbing, cyanosis, or edema    LABS:  CBC Full  -  ( 09 May 2018 20:40 )  WBC Count : 11.36 K/uL  Hemoglobin : 15.6 g/dL  Hematocrit : 46.2 %  Platelet Count - Automated : 319 K/uL  Mean Cell Volume : 88.7 fl  Mean Cell Hemoglobin : 29.9 pg  Mean Cell Hemoglobin Concentration : 33.8 gm/dL  Auto Neutrophil # : 8.86 K/uL  Auto Lymphocyte # : 0.80 K/uL  Auto Monocyte # : 1.70 K/uL  Auto Eosinophil # : 0.00 K/uL  Auto Basophil # : 0.00 K/uL  Auto Neutrophil % : 73.0 %  Auto Lymphocyte % : 7.0 %  Auto Monocyte % : 15.0 %  Auto Eosinophil % : 0.0 %  Auto Basophil % : 0.0 %        138  |  88<L>  |  35<H>  ----------------------------<  x   3.3<L>   |  38<H>  |  1.97<H>    Ca    9.6      09 May 2018 23:37  Mg     3.2         TPro  6.2  /  Alb  2.5<L>  /  TBili  1.1  /  DBili  x   /  AST  11<L>  /  ALT  13  /  AlkPhos  70      CAPILLARY BLOOD GLUCOSE      POCT Blood Glucose.: 555 mg/dL (09 May 2018 23:14)    PT/INR - ( 09 May 2018 20:40 )   PT: 11.5 sec;   INR: 1.05 ratio         PTT - ( 09 May 2018 20:40 )  PTT:23.4 sec  Urinalysis Basic - ( 09 May 2018 20:40 )    Color: Yellow / Appearance: Clear / S.010 / pH: x  Gluc: x / Ketone: Negative  / Bili: Negative / Urobili: Negative mg/dL   Blood: x / Protein: Negative mg/dL / Nitrite: Negative   Leuk Esterase: Negative / RBC: 3-5 /HPF / WBC 0-2   Sq Epi: x / Non Sq Epi: Few / Bacteria: Occasional                  CRITICAL CARE TIME SPENT: 60 min

## 2018-05-10 NOTE — PROGRESS NOTE ADULT - SUBJECTIVE AND OBJECTIVE BOX
Post-op check    S/P Ex lap, ELAYNE, SBR, abthera vac placement POD#0  67 year old Female seen and examined at bedside with family present. Patient is intubated, alert, and able to answer questions by nodding head. Admits to abdominal pain well controlled with medication. Admits to NGT discomfort. Denies chest pain, shortness of breath, nausea/ vomiting, and dizziness.     Vital Signs Last 24 Hrs  T(F): 98.1 (18 @ 23:34), Max: 98.4 (18 @ 20:11)  HR: 77 (05-10-18 @ 02:23)  BP: 130/54 (18 @ 23:34)  RR: 17 (18 @ 23:34)  SpO2: 100% (05-10-18 @ 02:23)    GENERAL: Alert, NAD, intubated, NGT  CHEST/LUNG: Clear to auscultation bilaterally, respirations nonlabored  HEART: S1S2, Regular rate and rhythm  ABDOMEN: Abthera vac intact and functioning well with good seal. -bowel sounds, soft, Appropriate incisional tenderness, softly distended  : sanchez indwelling with clear yellow urine  EXTREMITIES:  no calf tenderness, No edema, intermittent compression devices in place bilaterally    Assessment: 68 y/o female PMHx HTN, DM, HLD,  x 2 (32 and 27 years ago), DVT 1986 with closed loop SBO S/P Ex lap, ELAYNE, SBR, abthera vac placement POD#0    Plan:   - Return to OR planning in 24 hours  - local wound care with abthera vac  - NPO, NGT, IVF, sanchez  - DVT prophylaxis, pain control  - Continue IV antibiotics   - continue current management per ICU  - will discuss with surgical attending

## 2018-05-11 LAB
ANION GAP SERPL CALC-SCNC: 6 MMOL/L — SIGNIFICANT CHANGE UP (ref 5–17)
ANION GAP SERPL CALC-SCNC: 8 MMOL/L — SIGNIFICANT CHANGE UP (ref 5–17)
APTT BLD: 23.3 SEC — LOW (ref 27.5–37.4)
BLD GP AB SCN SERPL QL: SIGNIFICANT CHANGE UP
BUN SERPL-MCNC: 18 MG/DL — SIGNIFICANT CHANGE UP (ref 7–23)
BUN SERPL-MCNC: 23 MG/DL — SIGNIFICANT CHANGE UP (ref 7–23)
CALCIUM SERPL-MCNC: 8.2 MG/DL — LOW (ref 8.5–10.1)
CALCIUM SERPL-MCNC: 8.6 MG/DL — SIGNIFICANT CHANGE UP (ref 8.5–10.1)
CHLORIDE SERPL-SCNC: 106 MMOL/L — SIGNIFICANT CHANGE UP (ref 96–108)
CHLORIDE SERPL-SCNC: 109 MMOL/L — HIGH (ref 96–108)
CO2 SERPL-SCNC: 33 MMOL/L — HIGH (ref 22–31)
CO2 SERPL-SCNC: 34 MMOL/L — HIGH (ref 22–31)
CREAT SERPL-MCNC: 0.99 MG/DL — SIGNIFICANT CHANGE UP (ref 0.5–1.3)
CREAT SERPL-MCNC: 1.26 MG/DL — SIGNIFICANT CHANGE UP (ref 0.5–1.3)
CULTURE RESULTS: SIGNIFICANT CHANGE UP
GLUCOSE BLDC GLUCOMTR-MCNC: 132 MG/DL — HIGH (ref 70–99)
GLUCOSE BLDC GLUCOMTR-MCNC: 136 MG/DL — HIGH (ref 70–99)
GLUCOSE BLDC GLUCOMTR-MCNC: 141 MG/DL — HIGH (ref 70–99)
GLUCOSE BLDC GLUCOMTR-MCNC: 150 MG/DL — HIGH (ref 70–99)
GLUCOSE BLDC GLUCOMTR-MCNC: 152 MG/DL — HIGH (ref 70–99)
GLUCOSE BLDC GLUCOMTR-MCNC: 154 MG/DL — HIGH (ref 70–99)
GLUCOSE BLDC GLUCOMTR-MCNC: 161 MG/DL — HIGH (ref 70–99)
GLUCOSE BLDC GLUCOMTR-MCNC: 167 MG/DL — HIGH (ref 70–99)
GLUCOSE BLDC GLUCOMTR-MCNC: 99 MG/DL — SIGNIFICANT CHANGE UP (ref 70–99)
GLUCOSE SERPL-MCNC: 128 MG/DL — HIGH (ref 70–99)
GLUCOSE SERPL-MCNC: 185 MG/DL — HIGH (ref 70–99)
HCT VFR BLD CALC: 36 % — SIGNIFICANT CHANGE UP (ref 34.5–45)
HGB BLD-MCNC: 12.5 G/DL — SIGNIFICANT CHANGE UP (ref 11.5–15.5)
INR BLD: 0.94 RATIO — SIGNIFICANT CHANGE UP (ref 0.88–1.16)
MAGNESIUM SERPL-MCNC: 2.5 MG/DL — SIGNIFICANT CHANGE UP (ref 1.6–2.6)
MAGNESIUM SERPL-MCNC: 2.7 MG/DL — HIGH (ref 1.6–2.6)
MCHC RBC-ENTMCNC: 31 PG — SIGNIFICANT CHANGE UP (ref 27–34)
MCHC RBC-ENTMCNC: 34.7 GM/DL — SIGNIFICANT CHANGE UP (ref 32–36)
MCV RBC AUTO: 89.3 FL — SIGNIFICANT CHANGE UP (ref 80–100)
NRBC # BLD: 0 /100 WBCS — SIGNIFICANT CHANGE UP (ref 0–0)
PHOSPHATE SERPL-MCNC: 1.7 MG/DL — LOW (ref 2.5–4.5)
PHOSPHATE SERPL-MCNC: 2.3 MG/DL — LOW (ref 2.5–4.5)
PLATELET # BLD AUTO: 278 K/UL — SIGNIFICANT CHANGE UP (ref 150–400)
POTASSIUM SERPL-MCNC: 3 MMOL/L — LOW (ref 3.5–5.3)
POTASSIUM SERPL-MCNC: 3.2 MMOL/L — LOW (ref 3.5–5.3)
POTASSIUM SERPL-SCNC: 3 MMOL/L — LOW (ref 3.5–5.3)
POTASSIUM SERPL-SCNC: 3.2 MMOL/L — LOW (ref 3.5–5.3)
PROTHROM AB SERPL-ACNC: 10.2 SEC — SIGNIFICANT CHANGE UP (ref 9.8–12.7)
RBC # BLD: 4.03 M/UL — SIGNIFICANT CHANGE UP (ref 3.8–5.2)
RBC # FLD: 13.6 % — SIGNIFICANT CHANGE UP (ref 10.3–14.5)
SODIUM SERPL-SCNC: 148 MMOL/L — HIGH (ref 135–145)
SODIUM SERPL-SCNC: 148 MMOL/L — HIGH (ref 135–145)
SPECIMEN SOURCE: SIGNIFICANT CHANGE UP
WBC # BLD: 12.61 K/UL — HIGH (ref 3.8–10.5)
WBC # FLD AUTO: 12.61 K/UL — HIGH (ref 3.8–10.5)

## 2018-05-11 PROCEDURE — 71045 X-RAY EXAM CHEST 1 VIEW: CPT | Mod: 26

## 2018-05-11 RX ORDER — DEXTROSE 50 % IN WATER 50 %
25 SYRINGE (ML) INTRAVENOUS ONCE
Qty: 0 | Refills: 0 | Status: DISCONTINUED | OUTPATIENT
Start: 2018-05-11 | End: 2018-05-18

## 2018-05-11 RX ORDER — NOREPINEPHRINE BITARTRATE/D5W 8 MG/250ML
0.05 PLASTIC BAG, INJECTION (ML) INTRAVENOUS
Qty: 8 | Refills: 0 | Status: DISCONTINUED | OUTPATIENT
Start: 2018-05-11 | End: 2018-05-11

## 2018-05-11 RX ORDER — CHLORHEXIDINE GLUCONATE 213 G/1000ML
15 SOLUTION TOPICAL
Qty: 0 | Refills: 0 | Status: DISCONTINUED | OUTPATIENT
Start: 2018-05-11 | End: 2018-05-11

## 2018-05-11 RX ORDER — ONDANSETRON 8 MG/1
4 TABLET, FILM COATED ORAL EVERY 6 HOURS
Qty: 0 | Refills: 0 | Status: DISCONTINUED | OUTPATIENT
Start: 2018-05-11 | End: 2018-05-18

## 2018-05-11 RX ORDER — PROPOFOL 10 MG/ML
5 INJECTION, EMULSION INTRAVENOUS
Qty: 1000 | Refills: 0 | Status: DISCONTINUED | OUTPATIENT
Start: 2018-05-11 | End: 2018-05-11

## 2018-05-11 RX ORDER — CHLORHEXIDINE GLUCONATE 213 G/1000ML
1 SOLUTION TOPICAL
Qty: 0 | Refills: 0 | Status: DISCONTINUED | OUTPATIENT
Start: 2018-05-12 | End: 2018-05-18

## 2018-05-11 RX ORDER — NOREPINEPHRINE BITARTRATE/D5W 8 MG/250ML
0.05 PLASTIC BAG, INJECTION (ML) INTRAVENOUS
Qty: 8 | Refills: 0 | Status: DISCONTINUED | OUTPATIENT
Start: 2018-05-11 | End: 2018-05-12

## 2018-05-11 RX ORDER — SODIUM CHLORIDE 9 MG/ML
1000 INJECTION, SOLUTION INTRAVENOUS
Qty: 0 | Refills: 0 | Status: DISCONTINUED | OUTPATIENT
Start: 2018-05-11 | End: 2018-05-18

## 2018-05-11 RX ORDER — PIPERACILLIN AND TAZOBACTAM 4; .5 G/20ML; G/20ML
3.38 INJECTION, POWDER, LYOPHILIZED, FOR SOLUTION INTRAVENOUS EVERY 8 HOURS
Qty: 0 | Refills: 0 | Status: DISCONTINUED | OUTPATIENT
Start: 2018-05-11 | End: 2018-05-11

## 2018-05-11 RX ORDER — PANTOPRAZOLE SODIUM 20 MG/1
40 TABLET, DELAYED RELEASE ORAL DAILY
Qty: 0 | Refills: 0 | Status: DISCONTINUED | OUTPATIENT
Start: 2018-05-11 | End: 2018-05-18

## 2018-05-11 RX ORDER — POTASSIUM CHLORIDE 20 MEQ
10 PACKET (EA) ORAL ONCE
Qty: 0 | Refills: 0 | Status: COMPLETED | OUTPATIENT
Start: 2018-05-11 | End: 2018-05-11

## 2018-05-11 RX ORDER — HEPARIN SODIUM 5000 [USP'U]/ML
5000 INJECTION INTRAVENOUS; SUBCUTANEOUS EVERY 12 HOURS
Qty: 0 | Refills: 0 | Status: DISCONTINUED | OUTPATIENT
Start: 2018-05-11 | End: 2018-05-18

## 2018-05-11 RX ORDER — GLUCAGON INJECTION, SOLUTION 0.5 MG/.1ML
1 INJECTION, SOLUTION SUBCUTANEOUS ONCE
Qty: 0 | Refills: 0 | Status: DISCONTINUED | OUTPATIENT
Start: 2018-05-11 | End: 2018-05-18

## 2018-05-11 RX ORDER — ASPIRIN/CALCIUM CARB/MAGNESIUM 324 MG
81 TABLET ORAL DAILY
Qty: 0 | Refills: 0 | Status: DISCONTINUED | OUTPATIENT
Start: 2018-05-11 | End: 2018-05-18

## 2018-05-11 RX ORDER — CHLORHEXIDINE GLUCONATE 213 G/1000ML
1 SOLUTION TOPICAL DAILY
Qty: 0 | Refills: 0 | Status: DISCONTINUED | OUTPATIENT
Start: 2018-05-11 | End: 2018-05-11

## 2018-05-11 RX ORDER — SODIUM CHLORIDE 9 MG/ML
1000 INJECTION, SOLUTION INTRAVENOUS
Qty: 0 | Refills: 0 | Status: DISCONTINUED | OUTPATIENT
Start: 2018-05-11 | End: 2018-05-12

## 2018-05-11 RX ORDER — DEXTROSE 50 % IN WATER 50 %
12.5 SYRINGE (ML) INTRAVENOUS ONCE
Qty: 0 | Refills: 0 | Status: DISCONTINUED | OUTPATIENT
Start: 2018-05-11 | End: 2018-05-18

## 2018-05-11 RX ORDER — PIPERACILLIN AND TAZOBACTAM 4; .5 G/20ML; G/20ML
3.38 INJECTION, POWDER, LYOPHILIZED, FOR SOLUTION INTRAVENOUS EVERY 8 HOURS
Qty: 0 | Refills: 0 | Status: DISCONTINUED | OUTPATIENT
Start: 2018-05-11 | End: 2018-05-16

## 2018-05-11 RX ADMIN — PIPERACILLIN AND TAZOBACTAM 25 GRAM(S): 4; .5 INJECTION, POWDER, LYOPHILIZED, FOR SOLUTION INTRAVENOUS at 06:03

## 2018-05-11 RX ADMIN — Medication 6.46 MICROGRAM(S)/KG/MIN: at 20:00

## 2018-05-11 RX ADMIN — PANTOPRAZOLE SODIUM 40 MILLIGRAM(S): 20 TABLET, DELAYED RELEASE ORAL at 12:44

## 2018-05-11 RX ADMIN — Medication 100 MILLIEQUIVALENT(S): at 12:44

## 2018-05-11 RX ADMIN — PIPERACILLIN AND TAZOBACTAM 25 GRAM(S): 4; .5 INJECTION, POWDER, LYOPHILIZED, FOR SOLUTION INTRAVENOUS at 13:46

## 2018-05-11 RX ADMIN — SODIUM CHLORIDE 100 MILLILITER(S): 9 INJECTION, SOLUTION INTRAVENOUS at 22:24

## 2018-05-11 RX ADMIN — SODIUM CHLORIDE 100 MILLILITER(S): 9 INJECTION, SOLUTION INTRAVENOUS at 09:00

## 2018-05-11 RX ADMIN — CHLORHEXIDINE GLUCONATE 1 APPLICATION(S): 213 SOLUTION TOPICAL at 12:53

## 2018-05-11 RX ADMIN — Medication 6.46 MICROGRAM(S)/KG/MIN: at 11:00

## 2018-05-11 RX ADMIN — FENTANYL CITRATE 1.72 MICROGRAM(S)/KG/HR: 50 INJECTION INTRAVENOUS at 03:00

## 2018-05-11 RX ADMIN — Medication 6.46 MICROGRAM(S)/KG/MIN: at 06:06

## 2018-05-11 RX ADMIN — PROPOFOL 2.07 MICROGRAM(S)/KG/MIN: 10 INJECTION, EMULSION INTRAVENOUS at 04:00

## 2018-05-11 RX ADMIN — HEPARIN SODIUM 5000 UNIT(S): 5000 INJECTION INTRAVENOUS; SUBCUTANEOUS at 06:03

## 2018-05-11 RX ADMIN — PIPERACILLIN AND TAZOBACTAM 25 GRAM(S): 4; .5 INJECTION, POWDER, LYOPHILIZED, FOR SOLUTION INTRAVENOUS at 22:24

## 2018-05-11 NOTE — DIETITIAN INITIAL EVALUATION ADULT. - ENERGY NEEDS
Height (cm): 160.02 (05-09)  Weight (kg): 73.5 (05-11)  BMI (kg/m2): 28.7 (05-11)  Ideal Body Weight: 52.3 kg +/- 10%  % Ideal Body Weight:

## 2018-05-11 NOTE — DIETITIAN INITIAL EVALUATION ADULT. - PHYSICAL APPEARANCE
BMI 28.7; Nutrition focused physical exam conducted; Subcutaneous fat loss: [mild] Orbital fat pads region, [unable ]Buccal fat region, [unable ]Triceps region,  [unable ]Ribs region.  Muscle wasting: [moderate ]Temples region, [WNL ]Clavicle region, [WNL ]Shoulder region, [unable ]Scapula region, [WNL ]Interosseous region,  [WNL ]thigh region, [unable ]Calf region/overweight

## 2018-05-11 NOTE — PROGRESS NOTE ADULT - SUBJECTIVE AND OBJECTIVE BOX
Surgery Post-Op Check    Patient is s/p 2nd look laparotomy and anastomosis of small bowel. Now with abdominal closure and prevena vac placement. Denies any abdominal pain.    Vital Signs Last 24 Hrs  T(F): 99.5 (05-11-18 @ 16:00), Max: 99.7 (05-11-18 @ 04:37)  HR: 75 (05-11-18 @ 19:00)  BP: 102/54 (05-11-18 @ 19:00)  RR: 18 (05-11-18 @ 19:00)  SpO2: 99% (05-11-18 @ 19:00)  Wt(kg): --   CAPILLARY BLOOD GLUCOSE      POCT Blood Glucose.: 99 mg/dL (11 May 2018 16:34)      GENERAL: AAO in NAD  CHEST/LUNG: Expiratory wheezing L>R. NGT in place  HEART: RRR. Levophed still on board.  ABDOMEN: Soft, NTND. Prevena vac in place with good seal.  EXTREMITIES: Normal    Assessment: 67F POD 0 s/p RTOR for 2nd look laparotomy now with SB anastomosis and abdominal closure. Extubated earlier this evening, in no respiratory distress, remains on pressors.      Plan: Wean pressors as tolerated  NGT remains until +GI function  Pain control prn.  ABX to stay on for now.

## 2018-05-11 NOTE — BRIEF OPERATIVE NOTE - PRE-OP DX
Small bowel obstruction due to adhesions  05/10/2018    Active  Ranjit Sequeira
Small bowel obstruction  05/11/2018  S/P small bowel resection with bowel left in discontinuity for second look  Active  Dave Mathews  Small bowel obstruction due to adhesions  05/10/2018    Active  Ranjit Sequeira

## 2018-05-11 NOTE — BRIEF OPERATIVE NOTE - POST-OP DX
Small bowel obstruction  05/11/2018  S/P small bowel resection with bowel left in discontinuity for second look  Active  Dave Mathews  Small bowel obstruction due to adhesions  05/10/2018    Active  Ranjit Sequeira
Small bowel obstruction due to adhesions  05/10/2018    Active  Ranjit Sequeira

## 2018-05-11 NOTE — BRIEF OPERATIVE NOTE - OPERATION/FINDINGS
clean abdomen with viable small bowel throughout. Primary stapled small bowel anastomosis and abdominal wall closure afgter removal of AbThera VAC.

## 2018-05-11 NOTE — PROGRESS NOTE ADULT - SUBJECTIVE AND OBJECTIVE BOX
SURGERY PROGRESS HPI:  S/P Ex lap, ELAYNE, SBR, abthera vac placement POD#1  Patient seen and examined at bedside. Patient is intubated, alert, and able to answer questions by nodding head. Admits to abdominal pain well controlled with medication. Admits to NGT discomfort. Denies chest pain, shortness of breath, nausea/ vomiting, and dizziness.       Vital Signs Last 24 Hrs  T(C): 36.7 (10 May 2018 23:30), Max: 37.3 (10 May 2018 13:00)  T(F): 98.1 (10 May 2018 23:30), Max: 99.1 (10 May 2018 13:00)  HR: 67 (11 May 2018 01:43) (66 - 79)  BP: 115/58 (11 May 2018 01:30) (66/44 - 142/51)  BP(mean): 74 (11 May 2018 01:30) (53 - 86)  RR: 13 (11 May 2018 01:30) (11 - 18)  SpO2: 97% (11 May 2018 01:43) (95% - 100%)      PHYSICAL EXAM:    GENERAL: Alert, NAD, intubated, NGT 100cc/24hrs  CHEST/LUNG: Clear to auscultation bilaterally, respirations nonlabored  HEART: S1S2, Regular rate and rhythm  ABDOMEN: Abthera vac intact and functioning well with good seal. -bowel sounds, soft, Appropriate incisional tenderness, softly distended  : sanchez indwelling with clear yellow urine 825cc/24hrs  EXTREMITIES:  no calf tenderness, No edema, intermittent compression devices in place bilaterally    I&O's Detail    09 May 2018 07:01  -  10 May 2018 07:00  --------------------------------------------------------  IN:    fentaNYL Infusion.: 7.6 mL    lactated ringers.: 100 mL    norepinephrine Infusion: 12.9 mL    propofol Infusion: 6.2 mL  Total IN: 126.7 mL    OUT:    Voided: 30 mL  Total OUT: 30 mL    Total NET: 96.7 mL      10 May 2018 07:01  -  11 May 2018 04:09  --------------------------------------------------------  IN:    dextrose 5% + sodium chloride 0.9%.: 700 mL    fentaNYL Infusion.: 221.8 mL    insulin Infusion: 71 mL    IV PiggyBack: 200 mL    lactated ringers.: 1400 mL    norepinephrine Infusion: 781.5 mL    propofol Infusion: 145.4 mL  Total IN: 3519.7 mL    OUT:    Indwelling Catheter - Urethral: 825 mL    Nasoenteral Tube: 100 mL    VAC (Vacuum Assisted Closure) System: 730 mL  Total OUT: 1655 mL    Total NET: 1864.7 mL      LABS:                        15.7   7.70  )-----------( 227      ( 10 May 2018 04:30 )             46.7     05-10    138  |  92<L>  |  31<H>  ----------------------------<  431<H>  3.4<L>   |  32<H>  |  1.85<H>    Ca    9.6      10 May 2018 04:30  Phos  3.6     05-10  Mg     3.0     05-10    TPro  6.2  /  Alb  2.4<L>  /  TBili  0.9  /  DBili  x   /  AST  22  /  ALT  14  /  AlkPhos  73  05-10    PT/INR - ( 10 May 2018 04:24 )   PT: 11.4 sec;   INR: 1.04 ratio         PTT - ( 10 May 2018 04:24 )  PTT:23.6 sec  Urinalysis Basic - ( 09 May 2018 20:40 )    Color: Yellow / Appearance: Clear / S.010 / pH: x  Gluc: x / Ketone: Negative  / Bili: Negative / Urobili: Negative mg/dL   Blood: x / Protein: Negative mg/dL / Nitrite: Negative   Leuk Esterase: Negative / RBC: 3-5 /HPF / WBC 0-2   Sq Epi: x / Non Sq Epi: Few / Bacteria: Occasional        Assessment: 66 y/o female PMHx HTN, DM, HLD,  x 2 (32 and 27 years ago), DVT  with closed loop SBO S/P Ex lap, ELAYNE, SBR, abthera vac placement POD#1. On levophed and insulin drip.     Plan:   - Return to OR today  - local wound care with abthera vac  - NPO, NGT, IVF, sanchez  - DVT prophylaxis, pain control  - Continue IV antibiotics   - continue current management per ICU  - will discuss with surgical attending

## 2018-05-11 NOTE — PROGRESS NOTE ADULT - ASSESSMENT
68 y/o female PMHx HTN, DM, HLD, c-	section x 2 (32 and 27 years ago), DVT 1986 c/o abdominal pain and vomiting x 3 days found to hae small bowel obstruction now s/p OR with ex-lap and sbr left in discontinuity with abthera vac now RTOR for anastomosis and closure.      Neuro: Awake and alert on minimal sedation, with propofol.  Will keep intubated for return to OR likely in AM.    CV: Hypotension on small amount of levophed.  Currently weaning off of pressors.    Pulm: Extubate post op, if passes weaning trial.  GI: s/p anastomosis and sbo with ex-lap and sbr.  Keep ngt, follow up surgery recs.  ID: Zosyn for possible ischemic colitis.    Dispo: Extubate post-op if passes weaning trial.
68 y/o female PMHx HTN, DM, HLD,  x 2 (32 and 27 years ago), DVT  c/o abdominal pain and vomiting x 3 days found to hae small bowel obstruction now s/p OR with ex-lap and sbr left in discontinuity with abthera vac.      Neuro: Intubated and sedated, with plan for OR tomorrow.  Will keep intubated overnight.    CV: In Shock, on small dose of levophed.  Wean as tolerated.  Pulm: Intubated for procedure.  Will keep intubated for RTOR  GI: SBO now s/p ex-lap with ELAYNE, left in discontinuity with abthera vac.  Plan for OR tomorrow.    Endo: Hyperglycemia with A1C 9.8.  Will continue insulin drip to control blood sugars.   ID: Zosyn for possible ischemic bowel   Dispo: awaiting for return to OR.    Jordan   PGY6

## 2018-05-11 NOTE — DIETITIAN INITIAL EVALUATION ADULT. - OTHER INFO
pt seen due to ICU admission. pt admit c SBO, hyperglycemia. s/p small bowel resection 5/10, was left in discontinuity, temp closure. return to OR today for possible anastomosis. no family at bedside, unable to obtain diet, wt hx. pt c dm-HgA1c 9.8%, FSBS range in hosp 141-555mg/dL, insulin infusion initiated. provide dm education once pt medically stable.

## 2018-05-11 NOTE — PROGRESS NOTE ADULT - SUBJECTIVE AND OBJECTIVE BOX
INTERVAL HPI/OVERNIGHT EVENTS:      68 y/o female PMHx HTN, DM, HLD,  x 2 (32 and 27 years ago), DVT 1986 c/o abdominal pain and vomiting x 3 days found to hae small bowel obstruction now s/p OR with ex-lap and sbr left in discontinuity with abthera vac.      24 hour events: Awaiting OR today.      REVIEW OF SYSTEMS: [x] Unable to obtain because intubated and sedated.      ICU Vital Signs Last 24 Hrs  T(C): 37.5 (11 May 2018 16:00), Max: 37.6 (11 May 2018 04:37)  T(F): 99.5 (11 May 2018 16:00), Max: 99.7 (11 May 2018 04:37)  HR: 75 (11 May 2018 16:30) (55 - 77)  BP: 125/68 (11 May 2018 16:30) (58/34 - 195/69)  BP(mean): 86 (11 May 2018 16:30) (43 - 140)  ABP: --  ABP(mean): --  RR: 17 (11 May 2018 16:30) (10 - 17)  SpO2: 87% (11 May 2018 16:30) (87% - 100%)      ABG - ( 10 May 2018 02:46 )  pH, Arterial: x     pH, Blood: 7.53  /  pCO2: 43    /  pO2: 368   / HCO3: 35    / Base Excess: 11.3  /  SaO2: 99        I&O's Detail    10 May 2018 07:  -  11 May 2018 07:00  --------------------------------------------------------  IN:    dextrose 5% + sodium chloride 0.9%: 1000 mL    fentaNYL Infusion.: 263.4 mL    insulin Infusion: 77 mL    IV PiggyBack: 200 mL    lactated ringers.: 1400 mL    norepinephrine Infusion: 871.9 mL    propofol Infusion: 164 mL  Total IN: 3976.3 mL    OUT:    Indwelling Catheter - Urethral: 975 mL    Nasoenteral Tube: 300 mL    VAC (Vacuum Assisted Closure) System: 880 mL  Total OUT: 2155 mL    Total NET: 1821.3 mL      11 May 2018 07:01  -  11 May 2018 17:30  --------------------------------------------------------  IN:    dextrose 5% + sodium chloride 0.9%: 500 mL    fentaNYL Infusion.: 69 mL    insulin Infusion: 7 mL    norepinephrine Infusion: 181 mL    propofol Infusion: 31 mL  Total IN: 788 mL    OUT:    Indwelling Catheter - Urethral: 225 mL  Total OUT: 225 mL    Total NET: 563 mL          Mode: CPAP with PS  FiO2: 30  PEEP: 5  PS: 5  MAP: 7  PIP: 10    CAPILLARY BLOOD GLUCOSE      POCT Blood Glucose.: 99 mg/dL (11 May 2018 16:34)  POCT Blood Glucose.: 167 mg/dL (11 May 2018 14:06)  POCT Blood Glucose.: 150 mg/dL (11 May 2018 12:57)  POCT Blood Glucose.: 136 mg/dL (11 May 2018 11:07)  POCT Blood Glucose.: 132 mg/dL (11 May 2018 09:02)  POCT Blood Glucose.: 141 mg/dL (11 May 2018 07:15)  POCT Blood Glucose.: 152 mg/dL (11 May 2018 05:11)  POCT Blood Glucose.: 161 mg/dL (11 May 2018 03:11)  POCT Blood Glucose.: 154 mg/dL (11 May 2018 01:06)  POCT Blood Glucose.: 191 mg/dL (10 May 2018 23:00)  POCT Blood Glucose.: 172 mg/dL (10 May 2018 21:00)  POCT Blood Glucose.: 155 mg/dL (10 May 2018 18:30)      MEDICATIONS  NEURO  Meds:   RESPIRATORY  ABG - ( 10 May 2018 02:46 )  pH: x     /  pCO2: 43    /  pO2: 368   / HCO3: 35    / Base Excess: 11.3  /  SaO2: 99      Lactate: x        Meds:   CARDIOVASCULAR  Meds:   GI/NUTRITION  Meds:   GENITOURINARY  Meds: potassium chloride  10 mEq/100 mL IVPB 10 milliEquivalent(s) IV Intermittent every 1 hour    HEMATOLOGIC  Meds: aspirin  chewable 81 milliGRAM(s) Oral daily  heparin  Injectable 5000 Unit(s) SubCutaneous every 12 hours    [x] VTE Prophylaxis  INFECTIOUS DISEASES  Meds: piperacillin/tazobactam IVPB. 3.375 Gram(s) IV Intermittent every 8 hours    ENDOCRINE  CAPILLARY BLOOD GLUCOSE      POCT Blood Glucose.: 99 mg/dL (11 May 2018 16:34)  POCT Blood Glucose.: 167 mg/dL (11 May 2018 14:06)  POCT Blood Glucose.: 150 mg/dL (11 May 2018 12:57)  POCT Blood Glucose.: 136 mg/dL (11 May 2018 11:07)  POCT Blood Glucose.: 132 mg/dL (11 May 2018 09:02)  POCT Blood Glucose.: 141 mg/dL (11 May 2018 07:15)  POCT Blood Glucose.: 152 mg/dL (11 May 2018 05:11)  POCT Blood Glucose.: 161 mg/dL (11 May 2018 03:11)  POCT Blood Glucose.: 154 mg/dL (11 May 2018 01:06)  POCT Blood Glucose.: 191 mg/dL (10 May 2018 23:00)  POCT Blood Glucose.: 172 mg/dL (10 May 2018 21:00)  POCT Blood Glucose.: 155 mg/dL (10 May 2018 18:30)    Meds:   OTHER MEDICATIONS:  :    PHYSICAL EXAM:    GENERAL: Intubated just back from the OR.    NECK: Supple, No JVD, Normal thyroid  CHEST/LUNG: Clear to auscultation bilaterally; No rales, rhonchi, wheezing, or rubs  HEART: Regular rate and rhythm; No murmurs, rubs, or gallops  ABDOMEN: Soft, Nontender, Nondistended; Bowel sounds present; now closed, in full continuity.    EXTREMITIES:  2+ Peripheral Pulses, No clubbing, cyanosis, or edema  NERVOUS SYSTEM:  Alert & Oriented X3, Good concentration; Motor Strength 5/5 B/L upper and lower extremities; DTRs 2+ intact and symmetric    LABS:                        12.5   12.61 )-----------( 278      ( 11 May 2018 05:25 )             36.0      05-11    148<H>  |  106  |  23  ----------------------------<  128<H>  3.0<L>   |  34<H>  |  1.26    Ca    8.6      11 May 2018 09:28  Phos  1.7     05-11  Mg     2.7     05-11    TPro  6.2  /  Alb  2.4<L>  /  TBili  0.9  /  DBili  x   /  AST  22  /  ALT  14  /  AlkPhos  73  05-10    PT/INR - ( 11 May 2018 09:28 )   PT: 10.2 sec;   INR: 0.94 ratio         PTT - ( 11 May 2018 09:28 )  PTT:23.3 sec  Urinalysis Basic - ( 09 May 2018 20:40 )    Color: Yellow / Appearance: Clear / S.010 / pH: x  Gluc: x / Ketone: Negative  / Bili: Negative / Urobili: Negative mg/dL   Blood: x / Protein: Negative mg/dL / Nitrite: Negative   Leuk Esterase: Negative / RBC: 3-5 /HPF / WBC 0-2   Sq Epi: x / Non Sq Epi: Few / Bacteria: Occasional      Culture Results:   Culture grew 3 or more types of organisms which indicate  collection contamination; consider recollection only if clinically  indicated. (05-10 @ 08:37)

## 2018-05-11 NOTE — DIETITIAN INITIAL EVALUATION ADULT. - PERTINENT LABORATORY DATA
05-10 Na138 mmol/L Glu 431 mg/dL<H> K+ 3.4 mmol/L<L> Cr  1.85 mg/dL<H> BUN 31 mg/dL<H> Phos 3.6 mg/dL Alb 2.4 g/dL<L> PAB n/a

## 2018-05-11 NOTE — DIETITIAN INITIAL EVALUATION ADULT. - NS AS NUTRI INTERV ENTERAL NUTRITION
if pt remains intubated x >48 hrs post second surgery, consider initiating TF : Glucerna 1.2 @ 55mL/hr to provide 1584 kcals, 80g pro/Concentration/Rate/Volume/Composition

## 2018-05-11 NOTE — BRIEF OPERATIVE NOTE - PROCEDURE
<<-----Click on this checkbox to enter Procedure Anastomosis of intestine  05/11/2018  side-to-side but functional end-to-end DOLORES anastomosis  Active  SUMAN

## 2018-05-12 PROBLEM — Z00.00 ENCOUNTER FOR PREVENTIVE HEALTH EXAMINATION: Status: ACTIVE | Noted: 2018-05-12

## 2018-05-12 LAB
ANION GAP SERPL CALC-SCNC: 5 MMOL/L — SIGNIFICANT CHANGE UP (ref 5–17)
BUN SERPL-MCNC: 11 MG/DL — SIGNIFICANT CHANGE UP (ref 7–23)
CALCIUM SERPL-MCNC: 7.3 MG/DL — LOW (ref 8.5–10.1)
CHLORIDE SERPL-SCNC: 115 MMOL/L — HIGH (ref 96–108)
CO2 SERPL-SCNC: 29 MMOL/L — SIGNIFICANT CHANGE UP (ref 22–31)
CREAT SERPL-MCNC: 0.86 MG/DL — SIGNIFICANT CHANGE UP (ref 0.5–1.3)
GLUCOSE BLDC GLUCOMTR-MCNC: 191 MG/DL — HIGH (ref 70–99)
GLUCOSE BLDC GLUCOMTR-MCNC: 293 MG/DL — HIGH (ref 70–99)
GLUCOSE SERPL-MCNC: 370 MG/DL — HIGH (ref 70–99)
HCT VFR BLD CALC: 32.9 % — LOW (ref 34.5–45)
HGB BLD-MCNC: 10.6 G/DL — LOW (ref 11.5–15.5)
MAGNESIUM SERPL-MCNC: 2.4 MG/DL — SIGNIFICANT CHANGE UP (ref 1.6–2.6)
MCHC RBC-ENTMCNC: 30.1 PG — SIGNIFICANT CHANGE UP (ref 27–34)
MCHC RBC-ENTMCNC: 32.2 GM/DL — SIGNIFICANT CHANGE UP (ref 32–36)
MCV RBC AUTO: 93.5 FL — SIGNIFICANT CHANGE UP (ref 80–100)
NRBC # BLD: 0 /100 WBCS — SIGNIFICANT CHANGE UP (ref 0–0)
PHOSPHATE SERPL-MCNC: 3.1 MG/DL — SIGNIFICANT CHANGE UP (ref 2.5–4.5)
PLATELET # BLD AUTO: 179 K/UL — SIGNIFICANT CHANGE UP (ref 150–400)
POTASSIUM SERPL-MCNC: 4.2 MMOL/L — SIGNIFICANT CHANGE UP (ref 3.5–5.3)
POTASSIUM SERPL-SCNC: 4.2 MMOL/L — SIGNIFICANT CHANGE UP (ref 3.5–5.3)
RBC # BLD: 3.52 M/UL — LOW (ref 3.8–5.2)
RBC # FLD: 14 % — SIGNIFICANT CHANGE UP (ref 10.3–14.5)
SODIUM SERPL-SCNC: 149 MMOL/L — HIGH (ref 135–145)
WBC # BLD: 7.26 K/UL — SIGNIFICANT CHANGE UP (ref 3.8–10.5)
WBC # FLD AUTO: 7.26 K/UL — SIGNIFICANT CHANGE UP (ref 3.8–10.5)

## 2018-05-12 PROCEDURE — 71045 X-RAY EXAM CHEST 1 VIEW: CPT | Mod: 26

## 2018-05-12 PROCEDURE — 99291 CRITICAL CARE FIRST HOUR: CPT

## 2018-05-12 RX ORDER — ACETAMINOPHEN 500 MG
650 TABLET ORAL EVERY 6 HOURS
Qty: 0 | Refills: 0 | Status: DISCONTINUED | OUTPATIENT
Start: 2018-05-12 | End: 2018-05-18

## 2018-05-12 RX ORDER — MORPHINE SULFATE 50 MG/1
2 CAPSULE, EXTENDED RELEASE ORAL EVERY 4 HOURS
Qty: 0 | Refills: 0 | Status: DISCONTINUED | OUTPATIENT
Start: 2018-05-12 | End: 2018-05-18

## 2018-05-12 RX ORDER — INSULIN LISPRO 100/ML
VIAL (ML) SUBCUTANEOUS EVERY 6 HOURS
Qty: 0 | Refills: 0 | Status: DISCONTINUED | OUTPATIENT
Start: 2018-05-12 | End: 2018-05-12

## 2018-05-12 RX ORDER — INSULIN LISPRO 100/ML
4 VIAL (ML) SUBCUTANEOUS ONCE
Qty: 0 | Refills: 0 | Status: COMPLETED | OUTPATIENT
Start: 2018-05-12 | End: 2018-05-12

## 2018-05-12 RX ORDER — POTASSIUM CHLORIDE 20 MEQ
10 PACKET (EA) ORAL
Qty: 0 | Refills: 0 | Status: COMPLETED | OUTPATIENT
Start: 2018-05-12 | End: 2018-05-12

## 2018-05-12 RX ORDER — SODIUM CHLORIDE 9 MG/ML
1000 INJECTION, SOLUTION INTRAVENOUS
Qty: 0 | Refills: 0 | Status: DISCONTINUED | OUTPATIENT
Start: 2018-05-12 | End: 2018-05-15

## 2018-05-12 RX ORDER — INSULIN LISPRO 100/ML
VIAL (ML) SUBCUTANEOUS EVERY 6 HOURS
Qty: 0 | Refills: 0 | Status: DISCONTINUED | OUTPATIENT
Start: 2018-05-12 | End: 2018-05-18

## 2018-05-12 RX ORDER — POTASSIUM PHOSPHATE, MONOBASIC POTASSIUM PHOSPHATE, DIBASIC 236; 224 MG/ML; MG/ML
15 INJECTION, SOLUTION INTRAVENOUS ONCE
Qty: 0 | Refills: 0 | Status: COMPLETED | OUTPATIENT
Start: 2018-05-12 | End: 2018-05-12

## 2018-05-12 RX ORDER — POTASSIUM PHOSPHATE, MONOBASIC POTASSIUM PHOSPHATE, DIBASIC 236; 224 MG/ML; MG/ML
15 INJECTION, SOLUTION INTRAVENOUS ONCE
Qty: 0 | Refills: 0 | Status: DISCONTINUED | OUTPATIENT
Start: 2018-05-12 | End: 2018-05-12

## 2018-05-12 RX ADMIN — CHLORHEXIDINE GLUCONATE 1 APPLICATION(S): 213 SOLUTION TOPICAL at 12:29

## 2018-05-12 RX ADMIN — POTASSIUM PHOSPHATE, MONOBASIC POTASSIUM PHOSPHATE, DIBASIC 63.75 MILLIMOLE(S): 236; 224 INJECTION, SOLUTION INTRAVENOUS at 02:30

## 2018-05-12 RX ADMIN — PIPERACILLIN AND TAZOBACTAM 25 GRAM(S): 4; .5 INJECTION, POWDER, LYOPHILIZED, FOR SOLUTION INTRAVENOUS at 21:50

## 2018-05-12 RX ADMIN — Medication 81 MILLIGRAM(S): at 12:28

## 2018-05-12 RX ADMIN — Medication 100 MILLIEQUIVALENT(S): at 02:30

## 2018-05-12 RX ADMIN — Medication 100 MILLIEQUIVALENT(S): at 01:30

## 2018-05-12 RX ADMIN — Medication 4 UNIT(S): at 19:01

## 2018-05-12 RX ADMIN — Medication 1: at 23:40

## 2018-05-12 RX ADMIN — PIPERACILLIN AND TAZOBACTAM 25 GRAM(S): 4; .5 INJECTION, POWDER, LYOPHILIZED, FOR SOLUTION INTRAVENOUS at 05:12

## 2018-05-12 RX ADMIN — HEPARIN SODIUM 5000 UNIT(S): 5000 INJECTION INTRAVENOUS; SUBCUTANEOUS at 05:11

## 2018-05-12 RX ADMIN — PIPERACILLIN AND TAZOBACTAM 25 GRAM(S): 4; .5 INJECTION, POWDER, LYOPHILIZED, FOR SOLUTION INTRAVENOUS at 14:16

## 2018-05-12 RX ADMIN — SODIUM CHLORIDE 125 MILLILITER(S): 9 INJECTION, SOLUTION INTRAVENOUS at 19:24

## 2018-05-12 RX ADMIN — PANTOPRAZOLE SODIUM 40 MILLIGRAM(S): 20 TABLET, DELAYED RELEASE ORAL at 12:28

## 2018-05-12 RX ADMIN — Medication 100 MILLIEQUIVALENT(S): at 03:30

## 2018-05-12 RX ADMIN — HEPARIN SODIUM 5000 UNIT(S): 5000 INJECTION INTRAVENOUS; SUBCUTANEOUS at 18:21

## 2018-05-12 NOTE — PROGRESS NOTE ADULT - SUBJECTIVE AND OBJECTIVE BOX
INTERVAL HPI/OVERNIGHT EVENTS: No complaints overnight. RN has been able to decrease levo. No GI function. No pain.    Vital Signs Last 24 Hrs  T(C): 36.5 (12 May 2018 05:39), Max: 37.5 (11 May 2018 16:00)  T(F): 97.7 (12 May 2018 05:39), Max: 99.5 (11 May 2018 16:00)  HR: 78 (12 May 2018 05:30) (55 - 79)  BP: 100/71 (12 May 2018 05:30) (58/34 - 195/69)  BP(mean): 81 (12 May 2018 05:30) (43 - 140)  RR: 23 (12 May 2018 05:30) (10 - 23)  SpO2: 99% (12 May 2018 05:30) (87% - 100%)    MEDICATIONS  (STANDING):  aspirin  chewable 81 milliGRAM(s) Oral daily  chlorhexidine 4% Liquid 1 Application(s) Topical <User Schedule>  dextrose 5% + sodium chloride 0.9%. 1000 milliLiter(s) (100 mL/Hr) IV Continuous <Continuous>  dextrose 5%. 1000 milliLiter(s) (50 mL/Hr) IV Continuous <Continuous>  dextrose 50% Injectable 12.5 Gram(s) IV Push once  dextrose 50% Injectable 25 Gram(s) IV Push once  dextrose 50% Injectable 25 Gram(s) IV Push once  heparin  Injectable 5000 Unit(s) SubCutaneous every 12 hours  norepinephrine Infusion 0.05 MICROgram(s)/kG/Min (6.459 mL/Hr) IV Continuous <Continuous>  pantoprazole  Injectable 40 milliGRAM(s) IV Push daily  piperacillin/tazobactam IVPB. 3.375 Gram(s) IV Intermittent every 8 hours  potassium chloride  10 mEq/100 mL IVPB 10 milliEquivalent(s) IV Intermittent every 1 hour  potassium phosphate IVPB 15 milliMole(s) IV Intermittent once    MEDICATIONS  (PRN):  glucagon  Injectable 1 milliGRAM(s) IntraMuscular once PRN Glucose LESS THAN 70 milligrams/deciliter  ondansetron Injectable 4 milliGRAM(s) IV Push every 6 hours PRN Nausea      PHYSICAL EXAM    GENERAL: AAO in NAD  CHEST/LUNG: CTAB  HEART: RRR  ABDOMEN: Soft, NTND. Prevena in place.    I&O:  I&O's Detail    10 May 2018 07:01  -  11 May 2018 07:00  --------------------------------------------------------  IN:    dextrose 5% + sodium chloride 0.9%: 1000 mL    fentaNYL Infusion.: 263.4 mL    insulin Infusion: 77 mL    IV PiggyBack: 200 mL    lactated ringers.: 1400 mL    norepinephrine Infusion: 871.9 mL    propofol Infusion: 164 mL  Total IN: 3976.3 mL    OUT:    Indwelling Catheter - Urethral: 975 mL    Nasoenteral Tube: 300 mL    VAC (Vacuum Assisted Closure) System: 880 mL  Total OUT: 2155 mL    Total NET: 1821.3 mL      11 May 2018 07:01  -  12 May 2018 06:12  --------------------------------------------------------  IN:    dextrose 5% + sodium chloride 0.9%: 800 mL    dextrose 5% + sodium chloride 0.9%.: 1400 mL    fentaNYL Infusion.: 69 mL    insulin Infusion: 7 mL    IV PiggyBack: 100 mL    norepinephrine Infusion: 181 mL    norepinephrine Infusion: 45.4 mL    propofol Infusion: 31 mL  Total IN: 2633.4 mL    OUT:    Indwelling Catheter - Urethral: 1195 mL    Nasoenteral Tube: 100 mL  Total OUT: 1295 mL    Total NET: 1338.4 mL          LABS:                        12.5   12.61 )-----------( 278      ( 11 May 2018 05:25 )             36.0     05-11    148<H>  |  109<H>  |  18  ----------------------------<  185<H>  3.2<L>   |  33<H>  |  0.99    Ca    8.2<L>      11 May 2018 19:49  Phos  2.3     05-11  Mg     2.5     05-11      PT/INR - ( 11 May 2018 09:28 )   PT: 10.2 sec;   INR: 0.94 ratio         PTT - ( 11 May 2018 09:28 )  PTT:23.3 sec    Culture Results:   Culture grew 3 or more types of organisms which indicate  collection contamination; consider recollection only if clinically  indicated. (05-10 @ 08:37)        Impression: 67F POD 1 s/p RTOR for 2nd look laparotomy and primary closure. Now in ICU. Remains on levophed albeit with decreasing requirements.    Plan:  Wean levo as tolerated. MAP>65  Pain control  Keep NPO with NGT  Await GI function.  Puentes until pressors d/c'd

## 2018-05-12 NOTE — CHART NOTE - NSCHARTNOTEFT_GEN_A_CORE
Pt medically stable for transfer to medical floor.  Signed out to BLANKA Bernard    66 yo F with PMH: HTN, DM, DVT, HLD admitted for abdominal pain, nausea and vomiting.  Pt found to have high grade SBO.  Pt seen by surgery and taken to OR on 5/9for xlap found to have  closed loop adhesion SBO with ischemic bowel s/p resection and ELAYNE. Pt closed with abthera vac.  Pt returned to OR on 5/11 for second look.  Pt s/p SB anastamosis with abdominal closure.  Prevena in place.  Pt transiently on pressors but was dc early this am at 0600.  Pt's BP stable.  Pt will remain NPO with NGT to LWS as per surgery instructions.    . Pt medically stable for transfer to medical floor.  Signed out to NP Marco Antonio    68 yo F with PMH: HTN, DM, DVT, HLD admitted for abdominal pain, nausea and vomiting.  Pt found to have high grade SBO.  Pt seen by surgery and taken to OR on 5/9for xlap found to have  closed loop adhesion SBO with ischemic bowel s/p resection and ELAYNE. Pt closed with abthera vac.  Pt returned to OR on 5/11 for second look.  Pt s/p SB anastamosis with abdominal closure.  Prevena in place.  Pt transiently on pressors but was dc early this am at 0600.  Pt's BP stable.  Pt will remain NPO with NGT to LWS as per surgery instructions.      Vs as per EMR    Lungs: CTA  Heart: RRR  Abd: Soft/+BS/ (+) wound vac  Ext: No edema  Neuro: Awake/alert    a/p: SBO, s/p OR x2 as above    Resp: Supplemental O2 prn/ IS  ID: Cont [email or web address]  CVS: Weaned off pressor  HEME: DVT prophylaxis prophylaxis  FEN: NPO/ IV hydration  Endo: Follow Glu and cover with Lispro  GI: F/u as per Surg  Renal: Follow BUN/Cr and UO  Pain management  Social: May transfer to med-surg floor    CCT: 35 min    .

## 2018-05-13 LAB
ANION GAP SERPL CALC-SCNC: 5 MMOL/L — SIGNIFICANT CHANGE UP (ref 5–17)
BUN SERPL-MCNC: 8 MG/DL — SIGNIFICANT CHANGE UP (ref 7–23)
CALCIUM SERPL-MCNC: 8.1 MG/DL — LOW (ref 8.5–10.1)
CHLORIDE SERPL-SCNC: 116 MMOL/L — HIGH (ref 96–108)
CO2 SERPL-SCNC: 27 MMOL/L — SIGNIFICANT CHANGE UP (ref 22–31)
CREAT SERPL-MCNC: 0.72 MG/DL — SIGNIFICANT CHANGE UP (ref 0.5–1.3)
GLUCOSE BLDC GLUCOMTR-MCNC: 158 MG/DL — HIGH (ref 70–99)
GLUCOSE BLDC GLUCOMTR-MCNC: 177 MG/DL — HIGH (ref 70–99)
GLUCOSE BLDC GLUCOMTR-MCNC: 181 MG/DL — HIGH (ref 70–99)
GLUCOSE SERPL-MCNC: 176 MG/DL — HIGH (ref 70–99)
HCT VFR BLD CALC: 35.3 % — SIGNIFICANT CHANGE UP (ref 34.5–45)
HGB BLD-MCNC: 11.3 G/DL — LOW (ref 11.5–15.5)
MAGNESIUM SERPL-MCNC: 2.2 MG/DL — SIGNIFICANT CHANGE UP (ref 1.6–2.6)
MCHC RBC-ENTMCNC: 29.7 PG — SIGNIFICANT CHANGE UP (ref 27–34)
MCHC RBC-ENTMCNC: 32 GM/DL — SIGNIFICANT CHANGE UP (ref 32–36)
MCV RBC AUTO: 92.7 FL — SIGNIFICANT CHANGE UP (ref 80–100)
NRBC # BLD: 0 /100 WBCS — SIGNIFICANT CHANGE UP (ref 0–0)
PHOSPHATE SERPL-MCNC: 1.6 MG/DL — LOW (ref 2.5–4.5)
PLATELET # BLD AUTO: 199 K/UL — SIGNIFICANT CHANGE UP (ref 150–400)
POTASSIUM SERPL-MCNC: 4.1 MMOL/L — SIGNIFICANT CHANGE UP (ref 3.5–5.3)
POTASSIUM SERPL-SCNC: 4.1 MMOL/L — SIGNIFICANT CHANGE UP (ref 3.5–5.3)
RBC # BLD: 3.81 M/UL — SIGNIFICANT CHANGE UP (ref 3.8–5.2)
RBC # FLD: 13.7 % — SIGNIFICANT CHANGE UP (ref 10.3–14.5)
SODIUM SERPL-SCNC: 148 MMOL/L — HIGH (ref 135–145)
WBC # BLD: 7.55 K/UL — SIGNIFICANT CHANGE UP (ref 3.8–10.5)
WBC # FLD AUTO: 7.55 K/UL — SIGNIFICANT CHANGE UP (ref 3.8–10.5)

## 2018-05-13 RX ORDER — POTASSIUM PHOSPHATE, MONOBASIC POTASSIUM PHOSPHATE, DIBASIC 236; 224 MG/ML; MG/ML
15 INJECTION, SOLUTION INTRAVENOUS ONCE
Qty: 0 | Refills: 0 | Status: COMPLETED | OUTPATIENT
Start: 2018-05-13 | End: 2018-05-13

## 2018-05-13 RX ADMIN — Medication 1: at 12:48

## 2018-05-13 RX ADMIN — PIPERACILLIN AND TAZOBACTAM 25 GRAM(S): 4; .5 INJECTION, POWDER, LYOPHILIZED, FOR SOLUTION INTRAVENOUS at 15:11

## 2018-05-13 RX ADMIN — Medication 1: at 18:08

## 2018-05-13 RX ADMIN — Medication 1: at 05:06

## 2018-05-13 RX ADMIN — HEPARIN SODIUM 5000 UNIT(S): 5000 INJECTION INTRAVENOUS; SUBCUTANEOUS at 05:06

## 2018-05-13 RX ADMIN — SODIUM CHLORIDE 125 MILLILITER(S): 9 INJECTION, SOLUTION INTRAVENOUS at 15:19

## 2018-05-13 RX ADMIN — CHLORHEXIDINE GLUCONATE 1 APPLICATION(S): 213 SOLUTION TOPICAL at 13:17

## 2018-05-13 RX ADMIN — PANTOPRAZOLE SODIUM 40 MILLIGRAM(S): 20 TABLET, DELAYED RELEASE ORAL at 14:40

## 2018-05-13 RX ADMIN — PIPERACILLIN AND TAZOBACTAM 25 GRAM(S): 4; .5 INJECTION, POWDER, LYOPHILIZED, FOR SOLUTION INTRAVENOUS at 21:39

## 2018-05-13 RX ADMIN — POTASSIUM PHOSPHATE, MONOBASIC POTASSIUM PHOSPHATE, DIBASIC 63.75 MILLIMOLE(S): 236; 224 INJECTION, SOLUTION INTRAVENOUS at 10:50

## 2018-05-13 RX ADMIN — PIPERACILLIN AND TAZOBACTAM 25 GRAM(S): 4; .5 INJECTION, POWDER, LYOPHILIZED, FOR SOLUTION INTRAVENOUS at 05:06

## 2018-05-13 RX ADMIN — HEPARIN SODIUM 5000 UNIT(S): 5000 INJECTION INTRAVENOUS; SUBCUTANEOUS at 18:08

## 2018-05-13 RX ADMIN — SODIUM CHLORIDE 125 MILLILITER(S): 9 INJECTION, SOLUTION INTRAVENOUS at 05:11

## 2018-05-13 NOTE — CHART NOTE - NSCHARTNOTEFT_GEN_A_CORE
POD # 2 s/p small bowelanastomosis. no bowel function. no flatus. no abd pain.    Factors impacting intake: [ ] none [ ] nausea  [ ] vomiting [ ] diarrhea [ ] constipation  [ ]chewing problems [ ] swallowing issues  [ ] other:     Diet Prescription: Diet, NPO:   Except Medications (05-12-18 @ 00:39)    Intake: 0 %    Current Weight: Weight (kg): 68.9 (05-09 @ 23:34)  % Weight Change    Pertinent Medications: MEDICATIONS  (STANDING):  aspirin  chewable 81 milliGRAM(s) Oral daily  chlorhexidine 4% Liquid 1 Application(s) Topical <User Schedule>  dextrose 5%. 1000 milliLiter(s) (50 mL/Hr) IV Continuous <Continuous>  dextrose 50% Injectable 12.5 Gram(s) IV Push once  dextrose 50% Injectable 25 Gram(s) IV Push once  dextrose 50% Injectable 25 Gram(s) IV Push once  heparin  Injectable 5000 Unit(s) SubCutaneous every 12 hours  insulin lispro (HumaLOG) corrective regimen sliding scale   SubCutaneous every 6 hours  pantoprazole  Injectable 40 milliGRAM(s) IV Push daily  piperacillin/tazobactam IVPB. 3.375 Gram(s) IV Intermittent every 8 hours  potassium chloride  10 mEq/100 mL IVPB 10 milliEquivalent(s) IV Intermittent every 1 hour  potassium phosphate IVPB 15 milliMole(s) IV Intermittent once  sodium chloride 0.45%. 1000 milliLiter(s) (125 mL/Hr) IV Continuous <Continuous>    MEDICATIONS  (PRN):  acetaminophen  Suppository 650 milliGRAM(s) Rectal every 6 hours PRN For Temp greater than 38 C (100.4 F)  acetaminophen  Suppository. 650 milliGRAM(s) Rectal every 6 hours PRN Mild Pain (1 - 3)  glucagon  Injectable 1 milliGRAM(s) IntraMuscular once PRN Glucose LESS THAN 70 milligrams/deciliter  morphine  - Injectable 2 milliGRAM(s) IV Push every 4 hours PRN Moderate Pain (4 - 6)  ondansetron Injectable 4 milliGRAM(s) IV Push every 6 hours PRN Nausea    Pertinent Labs: 05-13 Na148 mmol/L<H> Glu 176 mg/dL<H> K+ 4.1 mmol/L Cr  0.72 mg/dL BUN 8 mg/dL 05-13 Phos 1.6 mg/dL<L> 05-10 Alb 2.4 g/dL<L> 05-10 [id number] 9.8 %<H>     CAPILLARY BLOOD GLUCOSE      POCT Blood Glucose.: 177 mg/dL (13 May 2018 05:03)  POCT Blood Glucose.: 191 mg/dL (12 May 2018 23:39)  POCT Blood Glucose.: 293 mg/dL (12 May 2018 18:17)    Skin: WDL    Estimated Needs:   [x ] no change since previous assessment  [ ] recalculated:     Previous Nutrition Diagnosis:   [ ] Inadequate Energy Intake [ ]Inadequate Oral Intake [ ] Excessive Energy Intake   [ ] Underweight [ ] Increased Nutrient Needs [ ] Overweight/Obesity   [ ] Altered GI Function [ ] Unintended Weight Loss [ ] Food & Nutrition Related Knowledge Deficit [ ] Malnutrition     Nutrition Diagnosis is [x ] ongoing  [ ] resolved [ ] not applicable     New Nutrition Diagnosis: [x ] not applicable       Interventions:   Recommend  [ ] Change Diet To: As medically feasible -> CCHO w/ evening snack; low sodium; low fiber  [ ] Nutrition Supplement  [x ] Nutrition Support- Consider alternate nutrition support PPN/TPN if NPO > 7 days  [ ] Other:     Monitoring and Evaluation:   [ ] PO intake [ x ] Tolerance to diet prescription [ x ] weights [ x ] labs[ x ] follow up per protocol  [ ] other: POD # 2 s/p small bowelanastomosis. no bowel function. no flatus. no abd pain.    Factors impacting intake: [ ] none [ ] nausea  [ ] vomiting [ ] diarrhea [ ] constipation  [ ]chewing problems [ ] swallowing issues  [ ] other:     Diet Prescription: Diet, NPO:   Except Medications (05-12-18 @ 00:39)    Intake: 0 %    Current Weight: Weight (kg): 68.9 (05-09 @ 23:34)  % Weight Change    Pertinent Medications: MEDICATIONS  (STANDING):  aspirin  chewable 81 milliGRAM(s) Oral daily  chlorhexidine 4% Liquid 1 Application(s) Topical <User Schedule>  dextrose 5%. 1000 milliLiter(s) (50 mL/Hr) IV Continuous <Continuous>  dextrose 50% Injectable 12.5 Gram(s) IV Push once  dextrose 50% Injectable 25 Gram(s) IV Push once  dextrose 50% Injectable 25 Gram(s) IV Push once  heparin  Injectable 5000 Unit(s) SubCutaneous every 12 hours  insulin lispro (HumaLOG) corrective regimen sliding scale   SubCutaneous every 6 hours  pantoprazole  Injectable 40 milliGRAM(s) IV Push daily  piperacillin/tazobactam IVPB. 3.375 Gram(s) IV Intermittent every 8 hours  potassium chloride  10 mEq/100 mL IVPB 10 milliEquivalent(s) IV Intermittent every 1 hour  potassium phosphate IVPB 15 milliMole(s) IV Intermittent once  sodium chloride 0.45%. 1000 milliLiter(s) (125 mL/Hr) IV Continuous <Continuous>    MEDICATIONS  (PRN):  acetaminophen  Suppository 650 milliGRAM(s) Rectal every 6 hours PRN For Temp greater than 38 C (100.4 F)  acetaminophen  Suppository. 650 milliGRAM(s) Rectal every 6 hours PRN Mild Pain (1 - 3)  glucagon  Injectable 1 milliGRAM(s) IntraMuscular once PRN Glucose LESS THAN 70 milligrams/deciliter  morphine  - Injectable 2 milliGRAM(s) IV Push every 4 hours PRN Moderate Pain (4 - 6)  ondansetron Injectable 4 milliGRAM(s) IV Push every 6 hours PRN Nausea    Pertinent Labs: 05-13 Na148 mmol/L<H> Glu 176 mg/dL<H> K+ 4.1 mmol/L Cr  0.72 mg/dL BUN 8 mg/dL 05-13 Phos 1.6 mg/dL<L> 05-10 Alb 2.4 g/dL<L> 05-10 [id number] 9.8 %<H>     CAPILLARY BLOOD GLUCOSE      POCT Blood Glucose.: 177 mg/dL (13 May 2018 05:03)  POCT Blood Glucose.: 191 mg/dL (12 May 2018 23:39)  POCT Blood Glucose.: 293 mg/dL (12 May 2018 18:17)    Skin: WDL    Estimated Needs:   [x ] no change since previous assessment  [ ] recalculated:     Previous Nutrition Diagnosis:   [ ] Inadequate Energy Intake [x ]Inadequate Oral Intake [ ] Excessive Energy Intake   [ ] Underweight [ ] Increased Nutrient Needs [ ] Overweight/Obesity   [ ] Altered GI Function [ ] Unintended Weight Loss [ ] Food & Nutrition Related Knowledge Deficit [ ] Malnutrition     Nutrition Diagnosis is [x ] ongoing  [ ] resolved [ ] not applicable     New Nutrition Diagnosis: [x ] not applicable       Interventions:   Recommend  [ ] Change Diet To: As medically feasible -> Clear liquids-> CCHO w/ evening snack; low sodium; low fiber  [ ] Nutrition Supplement  [x ] Nutrition Support- Consider alternate nutrition support PPN/TPN if NPO > 7 days  [ ] Other:     Monitoring and Evaluation:   [ ] PO intake [ x ] Tolerance to diet prescription [ x ] weights [ x ] labs[ x ] follow up per protocol  [ ] other:

## 2018-05-14 LAB
ANION GAP SERPL CALC-SCNC: 11 MMOL/L — SIGNIFICANT CHANGE UP (ref 5–17)
BUN SERPL-MCNC: 11 MG/DL — SIGNIFICANT CHANGE UP (ref 7–23)
CALCIUM SERPL-MCNC: 8.1 MG/DL — LOW (ref 8.5–10.1)
CHLORIDE SERPL-SCNC: 116 MMOL/L — HIGH (ref 96–108)
CO2 SERPL-SCNC: 24 MMOL/L — SIGNIFICANT CHANGE UP (ref 22–31)
CREAT SERPL-MCNC: 0.66 MG/DL — SIGNIFICANT CHANGE UP (ref 0.5–1.3)
GLUCOSE BLDC GLUCOMTR-MCNC: 116 MG/DL — HIGH (ref 70–99)
GLUCOSE BLDC GLUCOMTR-MCNC: 129 MG/DL — HIGH (ref 70–99)
GLUCOSE BLDC GLUCOMTR-MCNC: 141 MG/DL — HIGH (ref 70–99)
GLUCOSE BLDC GLUCOMTR-MCNC: 142 MG/DL — HIGH (ref 70–99)
GLUCOSE BLDC GLUCOMTR-MCNC: 154 MG/DL — HIGH (ref 70–99)
GLUCOSE SERPL-MCNC: 142 MG/DL — HIGH (ref 70–99)
HCT VFR BLD CALC: 33.7 % — LOW (ref 34.5–45)
HGB BLD-MCNC: 11.2 G/DL — LOW (ref 11.5–15.5)
MAGNESIUM SERPL-MCNC: 2.2 MG/DL — SIGNIFICANT CHANGE UP (ref 1.6–2.6)
MCHC RBC-ENTMCNC: 30.5 PG — SIGNIFICANT CHANGE UP (ref 27–34)
MCHC RBC-ENTMCNC: 33.2 GM/DL — SIGNIFICANT CHANGE UP (ref 32–36)
MCV RBC AUTO: 91.8 FL — SIGNIFICANT CHANGE UP (ref 80–100)
NRBC # BLD: 0 /100 WBCS — SIGNIFICANT CHANGE UP (ref 0–0)
PHOSPHATE SERPL-MCNC: 1.9 MG/DL — LOW (ref 2.5–4.5)
PLATELET # BLD AUTO: 202 K/UL — SIGNIFICANT CHANGE UP (ref 150–400)
POTASSIUM SERPL-MCNC: 3.5 MMOL/L — SIGNIFICANT CHANGE UP (ref 3.5–5.3)
POTASSIUM SERPL-SCNC: 3.5 MMOL/L — SIGNIFICANT CHANGE UP (ref 3.5–5.3)
RBC # BLD: 3.67 M/UL — LOW (ref 3.8–5.2)
RBC # FLD: 13.3 % — SIGNIFICANT CHANGE UP (ref 10.3–14.5)
SODIUM SERPL-SCNC: 151 MMOL/L — HIGH (ref 135–145)
SURGICAL PATHOLOGY FINAL REPORT - CH: SIGNIFICANT CHANGE UP
WBC # BLD: 6.9 K/UL — SIGNIFICANT CHANGE UP (ref 3.8–10.5)
WBC # FLD AUTO: 6.9 K/UL — SIGNIFICANT CHANGE UP (ref 3.8–10.5)

## 2018-05-14 RX ORDER — POTASSIUM PHOSPHATE, MONOBASIC POTASSIUM PHOSPHATE, DIBASIC 236; 224 MG/ML; MG/ML
15 INJECTION, SOLUTION INTRAVENOUS ONCE
Qty: 0 | Refills: 0 | Status: COMPLETED | OUTPATIENT
Start: 2018-05-14 | End: 2018-05-14

## 2018-05-14 RX ADMIN — PIPERACILLIN AND TAZOBACTAM 25 GRAM(S): 4; .5 INJECTION, POWDER, LYOPHILIZED, FOR SOLUTION INTRAVENOUS at 13:29

## 2018-05-14 RX ADMIN — HEPARIN SODIUM 5000 UNIT(S): 5000 INJECTION INTRAVENOUS; SUBCUTANEOUS at 17:02

## 2018-05-14 RX ADMIN — PIPERACILLIN AND TAZOBACTAM 25 GRAM(S): 4; .5 INJECTION, POWDER, LYOPHILIZED, FOR SOLUTION INTRAVENOUS at 05:25

## 2018-05-14 RX ADMIN — Medication 1: at 11:26

## 2018-05-14 RX ADMIN — PANTOPRAZOLE SODIUM 40 MILLIGRAM(S): 20 TABLET, DELAYED RELEASE ORAL at 11:26

## 2018-05-14 RX ADMIN — POTASSIUM PHOSPHATE, MONOBASIC POTASSIUM PHOSPHATE, DIBASIC 63.75 MILLIMOLE(S): 236; 224 INJECTION, SOLUTION INTRAVENOUS at 09:28

## 2018-05-14 RX ADMIN — HEPARIN SODIUM 5000 UNIT(S): 5000 INJECTION INTRAVENOUS; SUBCUTANEOUS at 05:25

## 2018-05-14 RX ADMIN — PIPERACILLIN AND TAZOBACTAM 25 GRAM(S): 4; .5 INJECTION, POWDER, LYOPHILIZED, FOR SOLUTION INTRAVENOUS at 21:41

## 2018-05-14 NOTE — PROGRESS NOTE ADULT - SUBJECTIVE AND OBJECTIVE BOX
SURGERY PROGRESS HPI:  Pt seen and examined at bedside. Pain is well controlled on pain medication. Pt tolerating NGT. Pt denies nausea and vomiting. No BM/flatus. Voiding well. Pt denies chest pain, SOB, dizziness, fever, chills.       Vital Signs Last 24 Hrs  T(C): 37.5 (13 May 2018 23:36), Max: 37.5 (13 May 2018 23:36)  T(F): 99.5 (13 May 2018 23:36), Max: 99.5 (13 May 2018 23:36)  HR: 81 (13 May 2018 23:36) (72 - 81)  BP: 128/74 (13 May 2018 23:36) (128/74 - 141/78)  BP(mean): 93 (13 May 2018 08:00) (93 - 93)  RR: 16 (13 May 2018 23:36) (16 - 23)  SpO2: 97% (13 May 2018 23:36) (97% - 98%)      PHYSICAL EXAM:    GENERAL: NAD  HEAD:  NGT with bilious output  CHEST/LUNG: Clear to ausculation, bilaterally   HEART: RRR S1S2  ABDOMEN: Prevena intact and functioning well with good seal, softly distended, hypoactive BS, soft, appropriate incisional tenderness  EXTREMITIES:  calf soft, non tender b/l    I&O's Detail    12 May 2018 07:01  -  13 May 2018 07:00  --------------------------------------------------------  IN:    dextrose 5% + sodium chloride 0.9%: 500 mL    IV PiggyBack: 100 mL    sodium chloride 0.45%.: 875 mL  Total IN: 1475 mL    OUT:    Indwelling Catheter - Urethral: 180 mL    Voided: 400 mL  Total OUT: 580 mL    Total NET: 895 mL      13 May 2018 07:01  -  14 May 2018 05:20  --------------------------------------------------------  IN:    IV PiggyBack: 200 mL    sodium chloride 0.45%.: 1500 mL  Total IN: 1700 mL    OUT:    Drain: 650 mL    Nasoenteral Tube: 600 mL    Voided: 600 mL  Total OUT: 1850 mL    Total NET: -150 mL    LABS:                        11.3   7.55  )-----------( 199      ( 13 May 2018 04:12 )             35.3     05-13    148<H>  |  116<H>  |  8   ----------------------------<  176<H>  4.1   |  27  |  0.72    Ca    8.1<L>      13 May 2018 04:12  Phos  1.6     05-13  Mg     2.2     05-13        Assessment: 67F admitted with closed loop SBO s/p ex lap, ELAYNE, SSBR with no anastomosis, abthera vac placement 5/10 and RTOR for anastomosis and abdominal wall closure 5/11.    Plan:  -NGT, NPO, IVF, await GI function  -pain management prn  -continue DVT prophylaxis, OOB, Ambulating  -continue incentive spirometer  -continue local wound care with prevena  -continue antibiotics   -f/u labs  -will discuss pt with surgical attending

## 2018-05-14 NOTE — CHART NOTE - NSCHARTNOTEFT_GEN_A_CORE
Patient was seen and examined at bedside earlier this am, resting comfortably, with no acute events overnight.   Encouraged ambulation. May clamp NGT to get OOB, PT consult ordered.  Proper incentive spirometry use reinforced.  Hypophosphatemia repleted IV, Follow up am labs.

## 2018-05-15 LAB
ANION GAP SERPL CALC-SCNC: 11 MMOL/L — SIGNIFICANT CHANGE UP (ref 5–17)
BUN SERPL-MCNC: 10 MG/DL — SIGNIFICANT CHANGE UP (ref 7–23)
CALCIUM SERPL-MCNC: 8.1 MG/DL — LOW (ref 8.5–10.1)
CHLORIDE SERPL-SCNC: 116 MMOL/L — HIGH (ref 96–108)
CO2 SERPL-SCNC: 23 MMOL/L — SIGNIFICANT CHANGE UP (ref 22–31)
CREAT SERPL-MCNC: 0.7 MG/DL — SIGNIFICANT CHANGE UP (ref 0.5–1.3)
GLUCOSE BLDC GLUCOMTR-MCNC: 116 MG/DL — HIGH (ref 70–99)
GLUCOSE BLDC GLUCOMTR-MCNC: 164 MG/DL — HIGH (ref 70–99)
GLUCOSE BLDC GLUCOMTR-MCNC: 93 MG/DL — SIGNIFICANT CHANGE UP (ref 70–99)
GLUCOSE BLDC GLUCOMTR-MCNC: 99 MG/DL — SIGNIFICANT CHANGE UP (ref 70–99)
GLUCOSE SERPL-MCNC: 120 MG/DL — HIGH (ref 70–99)
HCT VFR BLD CALC: 32.4 % — LOW (ref 34.5–45)
HGB BLD-MCNC: 10.7 G/DL — LOW (ref 11.5–15.5)
MAGNESIUM SERPL-MCNC: 2.1 MG/DL — SIGNIFICANT CHANGE UP (ref 1.6–2.6)
MCHC RBC-ENTMCNC: 29.8 PG — SIGNIFICANT CHANGE UP (ref 27–34)
MCHC RBC-ENTMCNC: 33 GM/DL — SIGNIFICANT CHANGE UP (ref 32–36)
MCV RBC AUTO: 90.3 FL — SIGNIFICANT CHANGE UP (ref 80–100)
NRBC # BLD: 0 /100 WBCS — SIGNIFICANT CHANGE UP (ref 0–0)
PHOSPHATE SERPL-MCNC: 2.6 MG/DL — SIGNIFICANT CHANGE UP (ref 2.5–4.5)
PLATELET # BLD AUTO: 215 K/UL — SIGNIFICANT CHANGE UP (ref 150–400)
POTASSIUM SERPL-MCNC: 3.5 MMOL/L — SIGNIFICANT CHANGE UP (ref 3.5–5.3)
POTASSIUM SERPL-SCNC: 3.5 MMOL/L — SIGNIFICANT CHANGE UP (ref 3.5–5.3)
RBC # BLD: 3.59 M/UL — LOW (ref 3.8–5.2)
RBC # FLD: 13.2 % — SIGNIFICANT CHANGE UP (ref 10.3–14.5)
SODIUM SERPL-SCNC: 150 MMOL/L — HIGH (ref 135–145)
WBC # BLD: 8.2 K/UL — SIGNIFICANT CHANGE UP (ref 3.8–10.5)
WBC # FLD AUTO: 8.2 K/UL — SIGNIFICANT CHANGE UP (ref 3.8–10.5)

## 2018-05-15 RX ORDER — SODIUM CHLORIDE 9 MG/ML
500 INJECTION, SOLUTION INTRAVENOUS
Qty: 0 | Refills: 0 | Status: COMPLETED | OUTPATIENT
Start: 2018-05-15 | End: 2018-05-15

## 2018-05-15 RX ORDER — DEXTROSE MONOHYDRATE, SODIUM CHLORIDE, AND POTASSIUM CHLORIDE 50; .745; 4.5 G/1000ML; G/1000ML; G/1000ML
1000 INJECTION, SOLUTION INTRAVENOUS
Qty: 0 | Refills: 0 | Status: DISCONTINUED | OUTPATIENT
Start: 2018-05-15 | End: 2018-05-18

## 2018-05-15 RX ADMIN — PIPERACILLIN AND TAZOBACTAM 25 GRAM(S): 4; .5 INJECTION, POWDER, LYOPHILIZED, FOR SOLUTION INTRAVENOUS at 06:01

## 2018-05-15 RX ADMIN — Medication 1: at 11:05

## 2018-05-15 RX ADMIN — SODIUM CHLORIDE 500 MILLILITER(S): 9 INJECTION, SOLUTION INTRAVENOUS at 08:18

## 2018-05-15 RX ADMIN — PIPERACILLIN AND TAZOBACTAM 25 GRAM(S): 4; .5 INJECTION, POWDER, LYOPHILIZED, FOR SOLUTION INTRAVENOUS at 13:01

## 2018-05-15 RX ADMIN — HEPARIN SODIUM 5000 UNIT(S): 5000 INJECTION INTRAVENOUS; SUBCUTANEOUS at 17:03

## 2018-05-15 RX ADMIN — HEPARIN SODIUM 5000 UNIT(S): 5000 INJECTION INTRAVENOUS; SUBCUTANEOUS at 06:01

## 2018-05-15 RX ADMIN — PIPERACILLIN AND TAZOBACTAM 25 GRAM(S): 4; .5 INJECTION, POWDER, LYOPHILIZED, FOR SOLUTION INTRAVENOUS at 21:43

## 2018-05-15 RX ADMIN — DEXTROSE MONOHYDRATE, SODIUM CHLORIDE, AND POTASSIUM CHLORIDE 150 MILLILITER(S): 50; .745; 4.5 INJECTION, SOLUTION INTRAVENOUS at 11:06

## 2018-05-15 RX ADMIN — PANTOPRAZOLE SODIUM 40 MILLIGRAM(S): 20 TABLET, DELAYED RELEASE ORAL at 11:05

## 2018-05-15 NOTE — PROGRESS NOTE ADULT - SUBJECTIVE AND OBJECTIVE BOX
SURGERY PROGRESS HPI:  Pt seen and examined at bedside. Pain is mild and well controlled on pain medication. Pt tolerating NGT. Pt denies nausea and vomiting. No BM/flatus. Voiding well. Pt denies chest pain, SOB, dizziness, fever, chills. Ambulated yesterday.       Vital Signs Last 24 Hrs  T(C): 36.7 (15 May 2018 05:06), Max: 37.3 (15 May 2018 00:03)  T(F): 98 (15 May 2018 05:06), Max: 99.2 (15 May 2018 00:03)  HR: 71 (15 May 2018 05:06) (71 - 85)  BP: 121/50 (15 May 2018 05:06) (116/63 - 133/78)  BP(mean): --  RR: 16 (15 May 2018 05:06) (16 - 18)  SpO2: 96% (15 May 2018 05:06) (96% - 98%)      PHYSICAL EXAM:    GENERAL: NAD  HEAD:  NGT with bilious output  CHEST/LUNG: Clear to ausculation, bilaterally   HEART: RRR S1S2  ABDOMEN: Prevena intact and functioning well with good seal, softly distended, hypoactive BS, soft, nontender  EXTREMITIES:  calf soft, non tender b/l    I&O's Detail    13 May 2018 07:01  -  14 May 2018 07:00  --------------------------------------------------------  IN:    IV PiggyBack: 200 mL    sodium chloride 0.45%.: 3000 mL  Total IN: 3200 mL    OUT:    Drain: 650 mL    Nasoenteral Tube: 600 mL    Voided: 700 mL  Total OUT: 1950 mL    Total NET: 1250 mL      14 May 2018 07:01  -  15 May 2018 05:39  --------------------------------------------------------  IN:    IV PiggyBack: 250 mL    sodium chloride 0.45%.: 1500 mL  Total IN: 1750 mL    OUT:    Drain: 850 mL    Nasoenteral Tube: 750 mL  Total OUT: 1600 mL    Total NET: 150 mL    LABS:                        11.2   6.90  )-----------( 202      ( 14 May 2018 07:29 )             33.7     05-14    151<H>  |  116<H>  |  11  ----------------------------<  142<H>  3.5   |  24  |  0.66    Ca    8.1<L>      14 May 2018 07:29  Phos  1.9     05-14  Mg     2.2     05-14        Assessment: 67F admitted with closed loop SBO s/p ex lap, ELAYNE, SSBR with no anastomosis, abthera vac placement 5/10 and RTOR for anastomosis and abdominal wall closure 5/11.    Plan:  -NGT, NPO, IVF, await GI function  -pain management prn  -continue DVT prophylaxis, OOB, Ambulating  -continue incentive spirometer  -continue local wound care with prevena  -continue antibiotics   -f/u labs  -will discuss pt with surgical attending

## 2018-05-16 LAB
ANION GAP SERPL CALC-SCNC: 12 MMOL/L — SIGNIFICANT CHANGE UP (ref 5–17)
BUN SERPL-MCNC: 7 MG/DL — SIGNIFICANT CHANGE UP (ref 7–23)
CALCIUM SERPL-MCNC: 8.1 MG/DL — LOW (ref 8.5–10.1)
CHLORIDE SERPL-SCNC: 114 MMOL/L — HIGH (ref 96–108)
CO2 SERPL-SCNC: 21 MMOL/L — LOW (ref 22–31)
CREAT SERPL-MCNC: 0.65 MG/DL — SIGNIFICANT CHANGE UP (ref 0.5–1.3)
GLUCOSE BLDC GLUCOMTR-MCNC: 108 MG/DL — HIGH (ref 70–99)
GLUCOSE BLDC GLUCOMTR-MCNC: 111 MG/DL — HIGH (ref 70–99)
GLUCOSE BLDC GLUCOMTR-MCNC: 95 MG/DL — SIGNIFICANT CHANGE UP (ref 70–99)
GLUCOSE SERPL-MCNC: 104 MG/DL — HIGH (ref 70–99)
HCT VFR BLD CALC: 34 % — LOW (ref 34.5–45)
HGB BLD-MCNC: 11.2 G/DL — LOW (ref 11.5–15.5)
MAGNESIUM SERPL-MCNC: 2 MG/DL — SIGNIFICANT CHANGE UP (ref 1.6–2.6)
MCHC RBC-ENTMCNC: 30.1 PG — SIGNIFICANT CHANGE UP (ref 27–34)
MCHC RBC-ENTMCNC: 32.9 GM/DL — SIGNIFICANT CHANGE UP (ref 32–36)
MCV RBC AUTO: 91.4 FL — SIGNIFICANT CHANGE UP (ref 80–100)
NRBC # BLD: 0 /100 WBCS — SIGNIFICANT CHANGE UP (ref 0–0)
PHOSPHATE SERPL-MCNC: 2.3 MG/DL — LOW (ref 2.5–4.5)
PLATELET # BLD AUTO: 214 K/UL — SIGNIFICANT CHANGE UP (ref 150–400)
POTASSIUM SERPL-MCNC: 3.8 MMOL/L — SIGNIFICANT CHANGE UP (ref 3.5–5.3)
POTASSIUM SERPL-SCNC: 3.8 MMOL/L — SIGNIFICANT CHANGE UP (ref 3.5–5.3)
RBC # BLD: 3.72 M/UL — LOW (ref 3.8–5.2)
RBC # FLD: 13.4 % — SIGNIFICANT CHANGE UP (ref 10.3–14.5)
SODIUM SERPL-SCNC: 147 MMOL/L — HIGH (ref 135–145)
WBC # BLD: 10.72 K/UL — HIGH (ref 3.8–10.5)
WBC # FLD AUTO: 10.72 K/UL — HIGH (ref 3.8–10.5)

## 2018-05-16 RX ORDER — POTASSIUM PHOSPHATE, MONOBASIC POTASSIUM PHOSPHATE, DIBASIC 236; 224 MG/ML; MG/ML
15 INJECTION, SOLUTION INTRAVENOUS ONCE
Qty: 0 | Refills: 0 | Status: COMPLETED | OUTPATIENT
Start: 2018-05-16 | End: 2018-05-16

## 2018-05-16 RX ADMIN — POTASSIUM PHOSPHATE, MONOBASIC POTASSIUM PHOSPHATE, DIBASIC 63.75 MILLIMOLE(S): 236; 224 INJECTION, SOLUTION INTRAVENOUS at 10:09

## 2018-05-16 RX ADMIN — CHLORHEXIDINE GLUCONATE 1 APPLICATION(S): 213 SOLUTION TOPICAL at 12:14

## 2018-05-16 RX ADMIN — PANTOPRAZOLE SODIUM 40 MILLIGRAM(S): 20 TABLET, DELAYED RELEASE ORAL at 12:14

## 2018-05-16 RX ADMIN — DEXTROSE MONOHYDRATE, SODIUM CHLORIDE, AND POTASSIUM CHLORIDE 150 MILLILITER(S): 50; .745; 4.5 INJECTION, SOLUTION INTRAVENOUS at 10:10

## 2018-05-16 RX ADMIN — PIPERACILLIN AND TAZOBACTAM 25 GRAM(S): 4; .5 INJECTION, POWDER, LYOPHILIZED, FOR SOLUTION INTRAVENOUS at 05:37

## 2018-05-16 RX ADMIN — HEPARIN SODIUM 5000 UNIT(S): 5000 INJECTION INTRAVENOUS; SUBCUTANEOUS at 05:37

## 2018-05-16 RX ADMIN — Medication 81 MILLIGRAM(S): at 12:14

## 2018-05-16 RX ADMIN — HEPARIN SODIUM 5000 UNIT(S): 5000 INJECTION INTRAVENOUS; SUBCUTANEOUS at 18:23

## 2018-05-16 NOTE — PROGRESS NOTE ADULT - SUBJECTIVE AND OBJECTIVE BOX
Patient seen and examined at bedside, resting comfortably, with no complaints.   No acute events noted overnight. Patient is NPO. Tolerating NGT.   Denies flatus/BM. Denies abdominal pain, fever, chills, nausea, vomiting, c/p, sob.     Vital Signs Last 24 Hrs  T(F): 98.3 (05-16-18 @ 06:00), Max: 98.8 (05-15-18 @ 18:15)  HR: 76 (05-16-18 @ 06:00)  BP: 134/75 (05-16-18 @ 06:00)  RR: 16 (05-16-18 @ 06:00)  SpO2: 99% (05-16-18 @ 06:00)    POCT Blood Glucose.: 95 mg/dL (16 May 2018 06:25)    GENERAL: Alert, NAD  HEENT: NGT in situ draining bilious output (500cc/24hr).  CHEST/LUNG: Clear to auscultation bilaterally, respirations nonlabored  HEART: S1S2, Regular rate and rhythm;   ABDOMEN: Prevena intact and functioning well with good seal, softly distended, + hypoactive BS, soft, nontender  EXTREMITIES:  no calf tenderness, No edema    I&O's Detail    15 May 2018 07:01  -  16 May 2018 07:00  --------------------------------------------------------  IN:    IV PiggyBack: 100 mL    sodium chloride 0.45% with potassium chloride 20 mEq/L: 1500 mL  Total IN: 1600 mL    OUT:    Drain: 500 mL  Total OUT: 500 mL    Total NET: 1100 mL    LABS:                        11.2   10.72 )-----------( 214      ( 16 May 2018 07:29 )             34.0     05-16    147<H>  |  114<H>  |  7   ----------------------------<  104<H>  3.8   |  21<L>  |  0.65    Ca    8.1<L>      16 May 2018 07:29  Phos  2.3     05-16  Mg     2.0     05-16    Assessment: 67F admitted with closed loop SBO s/p ex lap, ELAYNE, SSBR with no anastomosis, abthera vac placement 5/10 and RTOR for anastomosis and abdominal wall closure 5/11. Presents now with slightly elevated WBC 10.72.    Plan:  -NGT, NPO, IVF, await GI function  -pain management prn  -continue DVT prophylaxis, OOB, Ambulating  -continue incentive spirometer  -continue local wound care with prevena  -continue antibiotics   -f/u AM labs, trend WBC  -will discuss pt with Dr. Sequeira Patient seen and examined at bedside, resting comfortably, with no complaints.   No acute events noted overnight.   Denies flatus/BM.   Denies abdominal pain, fever, chills, nausea, vomiting, c/p, sob.   OOB to ambulate.    Vital Signs Last 24 Hrs  T(F): 98.3 (05-16-18 @ 06:00), Max: 98.8 (05-15-18 @ 18:15)  HR: 76 (05-16-18 @ 06:00)  BP: 134/75 (05-16-18 @ 06:00)  RR: 16 (05-16-18 @ 06:00)  SpO2: 99% (05-16-18 @ 06:00)    GENERAL: Alert, oriented, NAD  HEENT: NGT in situ draining bilious output (500cc/24hr)  CHEST/LUNG: Clear to auscultation bilaterally, respirations nonlabored  HEART: S1S2, RRR  ABDOMEN: Prevena intact and functioning. Hypoactive BS, nondistended, nontender  EXTREMITIES: No calf tenderness, no pedal edema b/l     LABS:                        11.2   10.72 )-----------( 214      ( 16 May 2018 07:29 )             34.0     05-16    147<H>  |  114<H>  |  7   ----------------------------<  104<H>  3.8   |  21<L>  |  0.65    Ca    8.1<L>      16 May 2018 07:29  Phos  2.3     05-16  Mg     2.0     05-16    Assessment: 67F admitted with closed loop SBO s/p ex lap, ELAYNE, SBR with no anastomosis, abthera vac placement 5/10 and RTOR for anastomosis and abdominal wall closure 5/11. Now with slightly elevated WBC 10.72.  Plan:  -NGT, NPO, IVF, await GI function  -pain management prn  -continue DVT prophylaxis, OOB, Ambulating, incentive spirometer  -continue local wound care with prevena  -continue antibiotics   -f/u AM labs, trend WBC  -will d/w surgical attending

## 2018-05-16 NOTE — CHART NOTE - NSCHARTNOTEFT_GEN_A_CORE
Assessment: S/p Exp lab & small bowel resection 5/11. Pt NPO x 7 days & receiving NGT AAA=[id number] output x 24 hours. Pt states "I'm not hungry".    Factors impacting intake: [ ] none [ ] nausea  [ ] vomiting [ ] diarrhea [ ] constipation  [ ]chewing problems [ ] swallowing issues  [x ] other: +abdominal pain, No flatus, No BM & hypoactive bowels     Diet Prescription: Diet, NPO:   Except Medications  With Ice Chips/Sips of Water (05-15-18 @ 08:21)    Intake: NPO x 7 days    Current Weight: Weight (kg): Wt=76.2kg(5/16),  Wt=73.5 (05-11)),  ; +1 edema Lt arm  % Weight Change Wt gain 3.2kg(4%) x 5 days    Nutrition focused physical exam conducted; Subcutaneous fat loss: [mild] Orbital fat pads region, [WNL ]Buccal fat region, [Moderate ]Triceps region,  [distended abdomen ]Ribs region.  Muscle wasting:   [Moderate ]Temples region, [WNL ]Clavicle region, [WNL ]Shoulder region, [Unable ]Scapula region, [WNL ]Interosseous region,  [WNL ]thigh region, [Unable ]Calf region    Pertinent Medications: MEDICATIONS  (STANDING):  aspirin  chewable 81 milliGRAM(s) Oral daily  chlorhexidine 4% Liquid 1 Application(s) Topical <User Schedule>  dextrose 5%. 1000 milliLiter(s) (50 mL/Hr) IV Continuous <Continuous>  dextrose 50% Injectable 12.5 Gram(s) IV Push once  dextrose 50% Injectable 25 Gram(s) IV Push once  dextrose 50% Injectable 25 Gram(s) IV Push once  heparin  Injectable 5000 Unit(s) SubCutaneous every 12 hours  insulin lispro (HumaLOG) corrective regimen sliding scale   SubCutaneous every 6 hours  pantoprazole  Injectable 40 milliGRAM(s) IV Push daily  potassium chloride  10 mEq/100 mL IVPB 10 milliEquivalent(s) IV Intermittent every 1 hour  sodium chloride 0.45% with potassium chloride 20 mEq/L 1000 milliLiter(s) (150 mL/Hr) IV Continuous <Continuous>    MEDICATIONS  (PRN):  acetaminophen  Suppository 650 milliGRAM(s) Rectal every 6 hours PRN For Temp greater than 38 C (100.4 F)  acetaminophen  Suppository. 650 milliGRAM(s) Rectal every 6 hours PRN Mild Pain (1 - 3)  glucagon  Injectable 1 milliGRAM(s) IntraMuscular once PRN Glucose LESS THAN 70 milligrams/deciliter  morphine  - Injectable 2 milliGRAM(s) IV Push every 4 hours PRN Moderate Pain (4 - 6)  ondansetron Injectable 4 milliGRAM(s) IV Push every 6 hours PRN Nausea    Pertinent Labs: 05-16 Na147 mmol/L<H> Glu 104 mg/dL<H> K+ 3.8 mmol/L Cr  0.65 mg/dL BUN 7 mg/dL 05-16 Phos 2.3 mg/dL<L> 05-10 Alb 2.4 g/dL<L> 05-10 [id number] 9.8 %<H>     CAPILLARY BLOOD GLUCOSE      POCT Blood Glucose.: 95 mg/dL (16 May 2018 06:25)  POCT Blood Glucose.: 93 mg/dL (15 May 2018 21:59)  POCT Blood Glucose.: 99 mg/dL (15 May 2018 15:38)  POCT Blood Glucose.: 164 mg/dL (15 May 2018 10:50)    Skin: intact    Estimated Needs:   [x ] no change since previous assessment  [ ] recalculated:     Previous Nutrition Diagnosis:   [ ] Inadequate Energy Intake [x ]Inadequate Oral Intake [ ] Excessive Energy Intake   [ ] Underweight [ ] Increased Nutrient Needs [ ] Overweight/Obesity   [ ] Altered GI Function [ ] Unintended Weight Loss [ ] Food & Nutrition Related Knowledge Deficit [ ] severe Malnutrition [ ] moderate malnutrition    Nutrition Diagnosis is [x ] ongoing  [ ] resolved  [ ] improved  [ ] not applicable     New Nutrition Diagnosis: [x ] not applicable       Interventions:   Recommend  [x ] Continue:  [ ] Change Diet To:  [ ] Nutrition Supplement:  [x] Nutrition Support: Consider temp nutrition support via PPN due NPO x 7 days & hypoactive bowels  [ ] Other:     Monitoring and Evaluation:   [ ] PO intake [ x ] Tolerance to diet prescription [ x ] weights [ x ] labs[ x ] follow up per protocol  [ ] other: Assessment: S/p Exp lab & small bowel resection 5/11. Pt NPO x 7 days & receiving NGT [id number] output x 24 hours. Pt states "I'm not hungry". Pt states she moved BM x 1 this am    Factors impacting intake: [ ] none [ ] nausea  [ ] vomiting [ ] diarrhea [ ] constipation  [ ]chewing problems [ ] swallowing issues  [x ] other: +abdominal pain, No flatus & hypoactive bowels     Diet Prescription: Diet, NPO:   Except Medications  With Ice Chips/Sips of Water (05-15-18 @ 08:21)    Intake: NPO x 7 days    Current Weight: Weight (kg): Wt=76.2kg(5/16),  Wt=73.5 (05-11)),  ; +1 edema Lt arm  % Weight Change Wt gain 3.2kg(4%) x 5 days    Nutrition focused physical exam conducted; Subcutaneous fat loss: [mild] Orbital fat pads region, [WNL ]Buccal fat region, [Moderate ]Triceps region,  [distended abdomen ]Ribs region.  Muscle wasting:   [Moderate ]Temples region, [WNL ]Clavicle region, [WNL ]Shoulder region, [Unable ]Scapula region, [WNL ]Interosseous region,  [WNL ]thigh region, [Unable ]Calf region    Pertinent Medications: MEDICATIONS  (STANDING):  aspirin  chewable 81 milliGRAM(s) Oral daily  chlorhexidine 4% Liquid 1 Application(s) Topical <User Schedule>  dextrose 5%. 1000 milliLiter(s) (50 mL/Hr) IV Continuous <Continuous>  dextrose 50% Injectable 12.5 Gram(s) IV Push once  dextrose 50% Injectable 25 Gram(s) IV Push once  dextrose 50% Injectable 25 Gram(s) IV Push once  heparin  Injectable 5000 Unit(s) SubCutaneous every 12 hours  insulin lispro (HumaLOG) corrective regimen sliding scale   SubCutaneous every 6 hours  pantoprazole  Injectable 40 milliGRAM(s) IV Push daily  potassium chloride  10 mEq/100 mL IVPB 10 milliEquivalent(s) IV Intermittent every 1 hour  sodium chloride 0.45% with potassium chloride 20 mEq/L 1000 milliLiter(s) (150 mL/Hr) IV Continuous <Continuous>    MEDICATIONS  (PRN):  acetaminophen  Suppository 650 milliGRAM(s) Rectal every 6 hours PRN For Temp greater than 38 C (100.4 F)  acetaminophen  Suppository. 650 milliGRAM(s) Rectal every 6 hours PRN Mild Pain (1 - 3)  glucagon  Injectable 1 milliGRAM(s) IntraMuscular once PRN Glucose LESS THAN 70 milligrams/deciliter  morphine  - Injectable 2 milliGRAM(s) IV Push every 4 hours PRN Moderate Pain (4 - 6)  ondansetron Injectable 4 milliGRAM(s) IV Push every 6 hours PRN Nausea    Pertinent Labs: 05-16 Na147 mmol/L<H> Glu 104 mg/dL<H> K+ 3.8 mmol/L Cr  0.65 mg/dL BUN 7 mg/dL 05-16 Phos 2.3 mg/dL<L> 05-10 Alb 2.4 g/dL<L> 05-10 [id number] 9.8 %<H>     CAPILLARY BLOOD GLUCOSE      POCT Blood Glucose.: 95 mg/dL (16 May 2018 06:25)  POCT Blood Glucose.: 93 mg/dL (15 May 2018 21:59)  POCT Blood Glucose.: 99 mg/dL (15 May 2018 15:38)  POCT Blood Glucose.: 164 mg/dL (15 May 2018 10:50)    Skin: intact    Estimated Needs:   [x ] no change since previous assessment  [ ] recalculated:     Previous Nutrition Diagnosis:   [ ] Inadequate Energy Intake [x ]Inadequate Oral Intake [ ] Excessive Energy Intake   [ ] Underweight [ ] Increased Nutrient Needs [ ] Overweight/Obesity   [ ] Altered GI Function [ ] Unintended Weight Loss [ ] Food & Nutrition Related Knowledge Deficit [ ] severe Malnutrition [ ] moderate malnutrition    Nutrition Diagnosis is [x ] ongoing  [ ] resolved  [ ] improved  [ ] not applicable     New Nutrition Diagnosis: [x ] not applicable       Interventions:   Recommend  [x ] Continue:  [ ] Change Diet To:  [ ] Nutrition Supplement:  [x] Nutrition Support: Consider temp nutrition support via PPN due NPO x 7 days & hypoactive bowels  [ ] Other:     Monitoring and Evaluation:   [ ] PO intake [ x ] Tolerance to diet prescription [ x ] weights [ x ] labs[ x ] follow up per protocol  [ ] other:

## 2018-05-17 LAB
ANION GAP SERPL CALC-SCNC: 10 MMOL/L — SIGNIFICANT CHANGE UP (ref 5–17)
BUN SERPL-MCNC: 5 MG/DL — LOW (ref 7–23)
CALCIUM SERPL-MCNC: 8 MG/DL — LOW (ref 8.5–10.1)
CHLORIDE SERPL-SCNC: 113 MMOL/L — HIGH (ref 96–108)
CO2 SERPL-SCNC: 22 MMOL/L — SIGNIFICANT CHANGE UP (ref 22–31)
CREAT SERPL-MCNC: 0.55 MG/DL — SIGNIFICANT CHANGE UP (ref 0.5–1.3)
GLUCOSE BLDC GLUCOMTR-MCNC: 101 MG/DL — HIGH (ref 70–99)
GLUCOSE BLDC GLUCOMTR-MCNC: 104 MG/DL — HIGH (ref 70–99)
GLUCOSE BLDC GLUCOMTR-MCNC: 148 MG/DL — HIGH (ref 70–99)
GLUCOSE BLDC GLUCOMTR-MCNC: 191 MG/DL — HIGH (ref 70–99)
GLUCOSE BLDC GLUCOMTR-MCNC: 262 MG/DL — HIGH (ref 70–99)
GLUCOSE SERPL-MCNC: 103 MG/DL — HIGH (ref 70–99)
HCT VFR BLD CALC: 30.1 % — LOW (ref 34.5–45)
HGB BLD-MCNC: 10 G/DL — LOW (ref 11.5–15.5)
MAGNESIUM SERPL-MCNC: 1.8 MG/DL — SIGNIFICANT CHANGE UP (ref 1.6–2.6)
MCHC RBC-ENTMCNC: 30.3 PG — SIGNIFICANT CHANGE UP (ref 27–34)
MCHC RBC-ENTMCNC: 33.2 GM/DL — SIGNIFICANT CHANGE UP (ref 32–36)
MCV RBC AUTO: 91.2 FL — SIGNIFICANT CHANGE UP (ref 80–100)
NRBC # BLD: 0 /100 WBCS — SIGNIFICANT CHANGE UP (ref 0–0)
PHOSPHATE SERPL-MCNC: 2 MG/DL — LOW (ref 2.5–4.5)
PLATELET # BLD AUTO: 224 K/UL — SIGNIFICANT CHANGE UP (ref 150–400)
POTASSIUM SERPL-MCNC: 4.2 MMOL/L — SIGNIFICANT CHANGE UP (ref 3.5–5.3)
POTASSIUM SERPL-SCNC: 4.2 MMOL/L — SIGNIFICANT CHANGE UP (ref 3.5–5.3)
RBC # BLD: 3.3 M/UL — LOW (ref 3.8–5.2)
RBC # FLD: 13.4 % — SIGNIFICANT CHANGE UP (ref 10.3–14.5)
SODIUM SERPL-SCNC: 145 MMOL/L — SIGNIFICANT CHANGE UP (ref 135–145)
WBC # BLD: 11.23 K/UL — HIGH (ref 3.8–10.5)
WBC # FLD AUTO: 11.23 K/UL — HIGH (ref 3.8–10.5)

## 2018-05-17 RX ORDER — SODIUM,POTASSIUM PHOSPHATES 278-250MG
1 POWDER IN PACKET (EA) ORAL
Qty: 0 | Refills: 0 | Status: DISCONTINUED | OUTPATIENT
Start: 2018-05-17 | End: 2018-05-18

## 2018-05-17 RX ADMIN — Medication 3: at 17:06

## 2018-05-17 RX ADMIN — Medication 81 MILLIGRAM(S): at 11:12

## 2018-05-17 RX ADMIN — HEPARIN SODIUM 5000 UNIT(S): 5000 INJECTION INTRAVENOUS; SUBCUTANEOUS at 06:29

## 2018-05-17 RX ADMIN — HEPARIN SODIUM 5000 UNIT(S): 5000 INJECTION INTRAVENOUS; SUBCUTANEOUS at 17:31

## 2018-05-17 RX ADMIN — MORPHINE SULFATE 2 MILLIGRAM(S): 50 CAPSULE, EXTENDED RELEASE ORAL at 23:20

## 2018-05-17 RX ADMIN — DEXTROSE MONOHYDRATE, SODIUM CHLORIDE, AND POTASSIUM CHLORIDE 150 MILLILITER(S): 50; .745; 4.5 INJECTION, SOLUTION INTRAVENOUS at 11:57

## 2018-05-17 RX ADMIN — DEXTROSE MONOHYDRATE, SODIUM CHLORIDE, AND POTASSIUM CHLORIDE 150 MILLILITER(S): 50; .745; 4.5 INJECTION, SOLUTION INTRAVENOUS at 02:32

## 2018-05-17 RX ADMIN — MORPHINE SULFATE 2 MILLIGRAM(S): 50 CAPSULE, EXTENDED RELEASE ORAL at 23:05

## 2018-05-17 RX ADMIN — Medication 1 TABLET(S): at 22:24

## 2018-05-17 RX ADMIN — Medication 1 TABLET(S): at 17:31

## 2018-05-17 RX ADMIN — PANTOPRAZOLE SODIUM 40 MILLIGRAM(S): 20 TABLET, DELAYED RELEASE ORAL at 11:12

## 2018-05-17 RX ADMIN — Medication 1: at 23:04

## 2018-05-17 NOTE — PROGRESS NOTE ADULT - SUBJECTIVE AND OBJECTIVE BOX
INTERVAL HPI/OVERNIGHT EVENTS:  Pt. seen and examined at bedside resting comfortably. Patient admits to improving postop abdominal pain, well controlled with medication. Denies N/V. NGT was clamped overnight for 5 hours, residual output 0 and NGT was removed this morning. Patient admits to multiple BMs yesterday and had one overnight. +flatus. Ambulating and voiding well.   Denies fever/chills, chest pain, dyspnea, cough, dizziness.     Vital Signs Last 24 Hrs  T(C): 36.6 (17 May 2018 05:16), Max: 37.4 (16 May 2018 23:53)  T(F): 97.8 (17 May 2018 05:16), Max: 99.3 (16 May 2018 23:53)  HR: 73 (17 May 2018 05:16) (70 - 79)  BP: 128/67 (17 May 2018 05:16) (128/67 - 150/67)  RR: 18 (17 May 2018 05:16) (15 - 18)  SpO2: 100% (17 May 2018 05:16) (98% - 100%)    PHYSICAL EXAM:    GENERAL: NAD, A&Ox3.   CHEST/LUNG: Clear to ausculation, bilaterally   HEART: S1S2  ABDOMEN: Prevena intact and functioning well. non distended, +BS, soft, nontender, no guarding  EXTREMITIES:  calf soft, non tender b/l. 2+ distal pulses b/l.     I&O's Detail    15 May 2018 07:01  -  16 May 2018 07:00  --------------------------------------------------------  IN:    IV PiggyBack: 100 mL    sodium chloride 0.45% with potassium chloride 20 mEq/L: 1500 mL  Total IN: 1600 mL    OUT:    Drain: 500 mL  Total OUT: 500 mL    Total NET: 1100 mL      16 May 2018 07:01  -  17 May 2018 06:30  --------------------------------------------------------  IN:  Total IN: 0 mL    OUT:    Drain: 350 mL    Nasoenteral Tube: 250 mL  Total OUT: 600 mL    Total NET: -600 mL    LABS:                        11.2   10.72 )-----------( 214      ( 16 May 2018 07:29 )             34.0     05-16    147<H>  |  114<H>  |  7   ----------------------------<  104<H>  3.8   |  21<L>  |  0.65    Ca    8.1<L>      16 May 2018 07:29  Phos  2.3     05-16  Mg     2.0     05-16    Assessment: 67F admitted with closed loop SBO s/p ex lap, ELAYNE, SBR with no anastomosis, abthera vac placement 5/10 and RTOR for anastomosis and abdominal wall closure 5/11. NGT clamped overnight for 5 hours, no residual after reconnection to LWS. Patient clinically improving, having multiple BMs.     Plan:  -NGT removed this morning, start on clear liquids  -pain management prn  -continue DVT prophylaxis, OOB, Ambulating, incentive spirometer  -continue local wound care with prevena  -f/u AM labs, trend WBC  -will d/w surgical attending INTERVAL HPI/OVERNIGHT EVENTS:  Pt. seen and examined at bedside resting comfortably. Patient admits to improving postop abdominal pain, well controlled with medication, denies abdominal pain at this time. Denies N/V. NGT was clamped overnight for 5 hours, residual output 0 and NGT was removed this morning. Patient admits to multiple BMs yesterday and BM this morning. +flatus. Ambulating and voiding well.   Denies fever/chills, chest pain, dyspnea, cough, dizziness.     Vital Signs Last 24 Hrs  T(C): 36.6 (17 May 2018 05:16), Max: 37.4 (16 May 2018 23:53)  T(F): 97.8 (17 May 2018 05:16), Max: 99.3 (16 May 2018 23:53)  HR: 73 (17 May 2018 05:16) (70 - 79)  BP: 128/67 (17 May 2018 05:16) (128/67 - 150/67)  RR: 18 (17 May 2018 05:16) (15 - 18)  SpO2: 100% (17 May 2018 05:16) (98% - 100%)    PHYSICAL EXAM:    GENERAL: NAD, A&Ox3. NGT removed, no output seen in canister. Patient tolerated well.   CHEST/LUNG: Clear to ausculation, bilaterally   HEART: S1S2  ABDOMEN: Prevena intact and functioning well. Mildly distended, +BS, soft, nontender, no guarding  EXTREMITIES:  calf soft, non tender b/l. 2+ distal pulses b/l.     I&O's Detail    15 May 2018 07:01  -  16 May 2018 07:00  --------------------------------------------------------  IN:    IV PiggyBack: 100 mL    sodium chloride 0.45% with potassium chloride 20 mEq/L: 1500 mL  Total IN: 1600 mL    OUT:    Drain: 500 mL  Total OUT: 500 mL    Total NET: 1100 mL      16 May 2018 07:01  -  17 May 2018 06:30  --------------------------------------------------------  IN:  Total IN: 0 mL    OUT:    Drain: 350 mL    Nasoenteral Tube: 250 mL  Total OUT: 600 mL    Total NET: -600 mL    LABS:                        11.2   10.72 )-----------( 214      ( 16 May 2018 07:29 )             34.0     05-16    147<H>  |  114<H>  |  7   ----------------------------<  104<H>  3.8   |  21<L>  |  0.65    Ca    8.1<L>      16 May 2018 07:29  Phos  2.3     05-16  Mg     2.0     05-16    Assessment: 67F admitted with closed loop SBO s/p ex lap, ELAYNE, SBR with no anastomosis, abthera vac placement 5/10 and RTOR for anastomosis and abdominal wall closure 5/11. NGT clamped overnight for 5 hours, no residual after reconnection to LWS. Patient clinically improving, having multiple BMs.     Plan:  - NGT removed this morning--> started on clear liquids, advance diet as tolerated  - pain management prn  - continue DVT prophylaxis, OOB, Ambulating, incentive spirometer  - continue local wound care with prevena  - f/u AM labs, trend WBC  - D/C planning once tolerating reg. diet  - will d/w surgical attending

## 2018-05-18 ENCOUNTER — TRANSCRIPTION ENCOUNTER (OUTPATIENT)
Age: 68
End: 2018-05-18

## 2018-05-18 VITALS
TEMPERATURE: 98 F | RESPIRATION RATE: 16 BRPM | DIASTOLIC BLOOD PRESSURE: 69 MMHG | OXYGEN SATURATION: 96 % | HEART RATE: 79 BPM | SYSTOLIC BLOOD PRESSURE: 130 MMHG

## 2018-05-18 LAB
ANION GAP SERPL CALC-SCNC: 8 MMOL/L — SIGNIFICANT CHANGE UP (ref 5–17)
BUN SERPL-MCNC: 3 MG/DL — LOW (ref 7–23)
CALCIUM SERPL-MCNC: 8 MG/DL — LOW (ref 8.5–10.1)
CHLORIDE SERPL-SCNC: 111 MMOL/L — HIGH (ref 96–108)
CO2 SERPL-SCNC: 23 MMOL/L — SIGNIFICANT CHANGE UP (ref 22–31)
CREAT SERPL-MCNC: 0.65 MG/DL — SIGNIFICANT CHANGE UP (ref 0.5–1.3)
GLUCOSE BLDC GLUCOMTR-MCNC: 170 MG/DL — HIGH (ref 70–99)
GLUCOSE BLDC GLUCOMTR-MCNC: 179 MG/DL — HIGH (ref 70–99)
GLUCOSE BLDC GLUCOMTR-MCNC: 189 MG/DL — HIGH (ref 70–99)
GLUCOSE SERPL-MCNC: 190 MG/DL — HIGH (ref 70–99)
HCT VFR BLD CALC: 31.2 % — LOW (ref 34.5–45)
HGB BLD-MCNC: 10.6 G/DL — LOW (ref 11.5–15.5)
MAGNESIUM SERPL-MCNC: 1.6 MG/DL — SIGNIFICANT CHANGE UP (ref 1.6–2.6)
MCHC RBC-ENTMCNC: 30 PG — SIGNIFICANT CHANGE UP (ref 27–34)
MCHC RBC-ENTMCNC: 34 GM/DL — SIGNIFICANT CHANGE UP (ref 32–36)
MCV RBC AUTO: 88.4 FL — SIGNIFICANT CHANGE UP (ref 80–100)
NRBC # BLD: 0 /100 WBCS — SIGNIFICANT CHANGE UP (ref 0–0)
PHOSPHATE SERPL-MCNC: 2.1 MG/DL — LOW (ref 2.5–4.5)
PLATELET # BLD AUTO: 231 K/UL — SIGNIFICANT CHANGE UP (ref 150–400)
POTASSIUM SERPL-MCNC: 3.8 MMOL/L — SIGNIFICANT CHANGE UP (ref 3.5–5.3)
POTASSIUM SERPL-SCNC: 3.8 MMOL/L — SIGNIFICANT CHANGE UP (ref 3.5–5.3)
RBC # BLD: 3.53 M/UL — LOW (ref 3.8–5.2)
RBC # FLD: 13.5 % — SIGNIFICANT CHANGE UP (ref 10.3–14.5)
SODIUM SERPL-SCNC: 142 MMOL/L — SIGNIFICANT CHANGE UP (ref 135–145)
WBC # BLD: 9.44 K/UL — SIGNIFICANT CHANGE UP (ref 3.8–10.5)
WBC # FLD AUTO: 9.44 K/UL — SIGNIFICANT CHANGE UP (ref 3.8–10.5)

## 2018-05-18 RX ADMIN — MORPHINE SULFATE 2 MILLIGRAM(S): 50 CAPSULE, EXTENDED RELEASE ORAL at 07:06

## 2018-05-18 RX ADMIN — MORPHINE SULFATE 2 MILLIGRAM(S): 50 CAPSULE, EXTENDED RELEASE ORAL at 12:49

## 2018-05-18 RX ADMIN — Medication 1 TABLET(S): at 11:58

## 2018-05-18 RX ADMIN — Medication 1 TABLET(S): at 08:43

## 2018-05-18 RX ADMIN — Medication 1 TABLET(S): at 17:37

## 2018-05-18 RX ADMIN — HEPARIN SODIUM 5000 UNIT(S): 5000 INJECTION INTRAVENOUS; SUBCUTANEOUS at 17:37

## 2018-05-18 RX ADMIN — Medication 1: at 17:36

## 2018-05-18 RX ADMIN — Medication 1: at 11:56

## 2018-05-18 RX ADMIN — PANTOPRAZOLE SODIUM 40 MILLIGRAM(S): 20 TABLET, DELAYED RELEASE ORAL at 11:58

## 2018-05-18 RX ADMIN — MORPHINE SULFATE 2 MILLIGRAM(S): 50 CAPSULE, EXTENDED RELEASE ORAL at 06:51

## 2018-05-18 RX ADMIN — HEPARIN SODIUM 5000 UNIT(S): 5000 INJECTION INTRAVENOUS; SUBCUTANEOUS at 06:42

## 2018-05-18 RX ADMIN — MORPHINE SULFATE 2 MILLIGRAM(S): 50 CAPSULE, EXTENDED RELEASE ORAL at 13:05

## 2018-05-18 RX ADMIN — Medication 81 MILLIGRAM(S): at 11:58

## 2018-05-18 RX ADMIN — Medication 1: at 08:41

## 2018-05-18 NOTE — PROGRESS NOTE ADULT - PROVIDER SPECIALTY LIST ADULT
Critical Care
Surgery
Critical Care

## 2018-05-18 NOTE — DISCHARGE NOTE ADULT - CARE PLAN
Principal Discharge DX:	Small bowel obstruction  Goal:	Fully recover, Tolerate LRD  Assessment and plan of treatment:	Followup with Dr. Lopez in 7-10 days. call office for appt.  Secondary Diagnosis:	DM type 2 (diabetes mellitus, type 2)  Assessment and plan of treatment:	Follow-up with PCP for continued care  Secondary Diagnosis:	Essential hypertension  Assessment and plan of treatment:	Follow-up with PCP for continued care

## 2018-05-18 NOTE — DISCHARGE NOTE ADULT - CARE PROVIDER_API CALL
Saman Lopez (MD), Surgery  [address] [facility]  [address]  [place] [address]  Phone: [phone]  Fax: [phone]

## 2018-05-18 NOTE — DISCHARGE NOTE ADULT - PLAN OF CARE
Fully recover, Tolerate LRD Followup with Dr. Lopez in 7-10 days. call office for appt. Follow-up with PCP for continued care

## 2018-05-18 NOTE — PROGRESS NOTE ADULT - SUBJECTIVE AND OBJECTIVE BOX
INTERVAL HPI/OVERNIGHT EVENTS:  Pt. seen and examined at bedside resting comfortably. Patient overall doing well, complained of some "gas pains" last night, received pain medication which helped. Pain much better this morning. Tolerating clear liquids, denies N/V. Still having multiple BMs, and +flatus.   Denies fever/chills, chest pain, dyspnea, cough, dizziness.     Vital Signs Last 24 Hrs  T(C): 36.4 (18 May 2018 05:07), Max: 37.4 (18 May 2018 00:10)  T(F): 97.6 (18 May 2018 05:07), Max: 99.3 (18 May 2018 00:10)  HR: 82 (18 May 2018 05:07) (79 - 88)  BP: 143/62 (18 May 2018 05:07) (112/73 - 146/77)  RR: 18 (18 May 2018 05:07) (15 - 18)  SpO2: 99% (18 May 2018 05:07) (97% - 99%)    PHYSICAL EXAM:    GENERAL: NAD  CHEST/LUNG: Clear to ausculation, bilaterally   HEART: S1S2  ABDOMEN: Prevena removed to find clean and dry midline incision with staple line intact, no drainage or bleeding from wound. Cleansed with NS and gauze dressing applied. Mild distention, +BS, soft, non tender, no guarding  EXTREMITIES:  calf soft, non tender b/l. 2+ distal pulses b/l.     I&O's Detail    16 May 2018 07:01  -  17 May 2018 07:00  --------------------------------------------------------  IN:    sodium chloride 0.45% with potassium chloride 20 mEq/L: 1800 mL  Total IN: 1800 mL    OUT:    Drain: 350 mL    Nasoenteral Tube: 250 mL  Total OUT: 600 mL    Total NET: 1200 mL      17 May 2018 07:01  -  18 May 2018 06:35  --------------------------------------------------------  IN:    Oral Fluid: 690 mL  Total IN: 690 mL    OUT:  Total OUT: 0 mL    Total NET: 690 mL      LABS:                        10.0   11.23 )-----------( 224      ( 17 May 2018 08:32 )             30.1     05-17    145  |  113<H>  |  5<L>  ----------------------------<  103<H>  4.2   |  22  |  0.55    Ca    8.0<L>      17 May 2018 08:34  Phos  2.0     05-17  Mg     1.8     05-17    Assessment: 67F admitted with closed loop SBO s/p ex lap, ELAYNE, SBR with no anastomosis, abthera vac placement 5/10 and RTOR for anastomosis and abdominal wall closure 5/11, POD #7, tolerating clears with continued bowel function (+BM/flatus), clinically doing well.     Plan:  - Advance to LRD  - pain management prn  - continue DVT prophylaxis, OOB, Ambulating, incentive spirometer  - continue local wound care  - f/u AM labs, trend WBC  - D/C planning once tolerating reg. diet  - will d/w surgical attending

## 2018-05-18 NOTE — DISCHARGE NOTE ADULT - INSTRUCTIONS
Low residue Diet Pts family at bedside.  Understanding of discharge instructions, meds, scheduling f/u appt with MD verbalized.  Written instructions given

## 2018-05-18 NOTE — DISCHARGE NOTE ADULT - PATIENT PORTAL LINK FT
You can access the [facility], offered by [facility], by registering with the following website: [email or web address]

## 2018-05-18 NOTE — DISCHARGE NOTE ADULT - HOSPITAL COURSE
Pt present to ER with abdominal pain and was found to have closed loop SBO. Pt was taken to OR for Exploratory laparotomy with SBR, abdomen was left open for re-evaluation of bowel. Pt returned to OR POD#1 for anastomosis and  abdominal closure. Pt present to ER with abdominal pain and was found to have closed loop SBO. Pt was taken to OR for Exploratory laparotomy with SBR, abdomen was left open for re-evaluation of bowel. Pt returned to OR POD#1 for anastomosis and  abdominal closure. Pt had NGT  which was removed POD#5 when there was return of bowel function. Pt is stable for discharge, abdominal pan is controlled, she tolerates low residue diet, ambulates and voids without issues.

## 2018-05-18 NOTE — PROGRESS NOTE ADULT - ATTENDING COMMENTS
I have seen and examined the patient and agree with Tarsha Sherman the surgical PA's note.  The patient is hyponatremic hyperchloremic so will give some free water.    Dr. Georgi Oneill contact information [phone]
Patient seen/examined.  Agree w above note and plan and have discussed plan w house staff.  Comfortable, tolerating diet, passing gas and BM.  Home    Saman Lopez MD
Agree with above.  SBO s/p ex-lap with resection left in discontinuity with abthera VAC temporary closure.  Remains intubated 12/450/30%/+5. Will keep intubated as plan to return to OR tomorrow to re-assess and possible anastomosis.  Hyperglycemia, will start insulin gtt.    Total attending critical care time spent: 35 minutes

## 2018-05-18 NOTE — DISCHARGE NOTE ADULT - MEDICATION SUMMARY - MEDICATIONS TO TAKE
I will START or STAY ON the medications listed below when I get home from the hospital:    Ibrahima Aspirin  -- 81 milligram(s) orally  -- Indication: For CAD    Percocet 5/325 oral tablet  -- 1 tab(s) by mouth every 6 hours, As Needed -for moderate pain MDD:4  -- Caution federal law prohibits the transfer of this drug to any person other  than the person for whom it was prescribed.  May cause drowsiness.  Alcohol may intensify this effect.  Use care when operating dangerous machinery.  This prescription cannot be refilled.  This product contains acetaminophen.  Do not use  with any other product containing acetaminophen to prevent possible liver damage.  Using more of this medication than prescribed may cause serious breathing problems.    -- Indication: For Pain    metFORMIN 500 mg oral tablet  -- 1 tab(s) by mouth 2 times a day  -- Indication: For DM type 2 (diabetes mellitus, type 2)    atorvastatin 20 mg oral tablet  -- 1 tab(s) by mouth once a day  -- Indication: For HLD    losartan-hydroCHLOROthiazide 100 mg-25 mg oral tablet  -- 1 tab(s) by mouth once a day  -- Indication: For Essential hypertension

## 2018-05-22 DIAGNOSIS — K56.609 UNSPECIFIED INTESTINAL OBSTRUCTION, UNSPECIFIED AS TO PARTIAL VERSUS COMPLETE OBSTRUCTION: ICD-10-CM

## 2018-05-22 DIAGNOSIS — I11.9 HYPERTENSIVE HEART DISEASE WITHOUT HEART FAILURE: ICD-10-CM

## 2018-05-22 DIAGNOSIS — I95.9 HYPOTENSION, UNSPECIFIED: ICD-10-CM

## 2018-05-22 DIAGNOSIS — Z79.84 LONG TERM (CURRENT) USE OF ORAL HYPOGLYCEMIC DRUGS: ICD-10-CM

## 2018-05-22 DIAGNOSIS — E87.6 HYPOKALEMIA: ICD-10-CM

## 2018-05-22 DIAGNOSIS — E78.5 HYPERLIPIDEMIA, UNSPECIFIED: ICD-10-CM

## 2018-05-22 DIAGNOSIS — E11.65 TYPE 2 DIABETES MELLITUS WITH HYPERGLYCEMIA: ICD-10-CM

## 2018-05-22 DIAGNOSIS — I25.10 ATHEROSCLEROTIC HEART DISEASE OF NATIVE CORONARY ARTERY WITHOUT ANGINA PECTORIS: ICD-10-CM

## 2018-05-22 DIAGNOSIS — Z79.82 LONG TERM (CURRENT) USE OF ASPIRIN: ICD-10-CM

## 2018-05-22 DIAGNOSIS — Z91.018 ALLERGY TO OTHER FOODS: ICD-10-CM

## 2018-05-22 DIAGNOSIS — E83.39 OTHER DISORDERS OF PHOSPHORUS METABOLISM: ICD-10-CM

## 2018-05-22 DIAGNOSIS — K55.039 ACUTE (REVERSIBLE) ISCHEMIA OF LARGE INTESTINE, EXTENT UNSPECIFIED: ICD-10-CM

## 2018-05-22 DIAGNOSIS — Z86.718 PERSONAL HISTORY OF OTHER VENOUS THROMBOSIS AND EMBOLISM: ICD-10-CM

## 2018-05-22 DIAGNOSIS — Z79.01 LONG TERM (CURRENT) USE OF ANTICOAGULANTS: ICD-10-CM

## 2018-05-24 PROBLEM — E11.9 TYPE 2 DIABETES MELLITUS WITHOUT COMPLICATIONS: Chronic | Status: ACTIVE | Noted: 2018-05-09

## 2018-05-24 PROBLEM — I10 ESSENTIAL (PRIMARY) HYPERTENSION: Chronic | Status: ACTIVE | Noted: 2018-05-09

## 2018-06-07 ENCOUNTER — APPOINTMENT (OUTPATIENT)
Dept: SURGERY | Facility: CLINIC | Age: 68
End: 2018-06-07
Payer: MEDICARE

## 2018-06-07 VITALS
OXYGEN SATURATION: 98 % | HEART RATE: 92 BPM | TEMPERATURE: 98 F | SYSTOLIC BLOOD PRESSURE: 129 MMHG | BODY MASS INDEX: 27.97 KG/M2 | DIASTOLIC BLOOD PRESSURE: 85 MMHG | WEIGHT: 152 LBS | HEIGHT: 62 IN | RESPIRATION RATE: 14 BRPM

## 2018-06-07 DIAGNOSIS — E11.9 TYPE 2 DIABETES MELLITUS W/OUT COMPLICATIONS: ICD-10-CM

## 2018-06-07 DIAGNOSIS — I10 ESSENTIAL (PRIMARY) HYPERTENSION: ICD-10-CM

## 2018-06-07 DIAGNOSIS — Z87.19 PERSONAL HISTORY OF OTHER DISEASES OF THE DIGESTIVE SYSTEM: ICD-10-CM

## 2018-06-07 DIAGNOSIS — Z82.49 FAMILY HISTORY OF ISCHEMIC HEART DISEASE AND OTHER DISEASES OF THE CIRCULATORY SYSTEM: ICD-10-CM

## 2018-06-07 DIAGNOSIS — Z83.3 FAMILY HISTORY OF DIABETES MELLITUS: ICD-10-CM

## 2018-06-07 DIAGNOSIS — E78.00 PURE HYPERCHOLESTEROLEMIA, UNSPECIFIED: ICD-10-CM

## 2018-06-07 DIAGNOSIS — Z09 ENCOUNTER FOR FOLLOW-UP EXAMINATION AFTER COMPLETED TREATMENT FOR CONDITIONS OTHER THAN MALIGNANT NEOPLASM: ICD-10-CM

## 2018-06-07 PROCEDURE — 99024 POSTOP FOLLOW-UP VISIT: CPT

## 2018-06-07 RX ORDER — ATORVASTATIN CALCIUM 80 MG/1
TABLET, FILM COATED ORAL
Refills: 0 | Status: ACTIVE | COMMUNITY

## 2018-06-07 RX ORDER — METFORMIN HYDROCHLORIDE 500 MG/1
500 TABLET, COATED ORAL
Refills: 0 | Status: ACTIVE | COMMUNITY

## 2018-06-07 RX ORDER — LOSARTAN POTASSIUM AND HYDROCHLOROTHIAZIDE 12.5; 1 MG/1; MG/1
100-12.5 TABLET ORAL
Refills: 0 | Status: ACTIVE | COMMUNITY

## 2019-03-06 NOTE — DIETITIAN INITIAL EVALUATION ADULT. - WEIGHT IN LBS
Patient:   LORETTA ALARCON            MRN: [id number]            FIN: [id number]              Age:   66 years     Sex:  MALE     :  49   Associated Diagnoses:   None   Author:   SKIP LOPEZ     Operative Report    Date of Procedure: 3/5/2019    Pre-op Diagnosis:  1) Left posterior temporal brain tumor    Post-op Diagnosis: Same    Procedure:  1) Left temporal craniotomy for gross total resection of brain tumor  2) Use of operative microscope  3) Use of navigation    Surgeon: Skip Montelongo M.D.    Assistant:  Naomi Ng PA-C    Specimen: Tumor to pathology for frozen and permament studies. 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    Complications: None    Disposition:  Stable    Indications:    The patient presented with the above outlined diagnosis. We have discussed the diagnosis with the patient and and/or family/medical decision maker(s).  The risks of the procedure were discussed in detail. The risks were said to include but not limited to infection, bleeding, transient or permanent neurological deficits including visual, speech, cognitive deficits, paralysis, coma, death. We discussed the possibility of residual or recurrent  tumor that may require repeat resection. The patient is aware of the possible need for adjuvant therapy. We discussed the possibility of anesthesia and/or medically related complications including MI, stroke, DVT, PE, etc. All questions were answered, no guarantees were made.  They wished for us to proceed as outlined above.    Procedure:    The patient was positioned supine with head rotated to the right exposing the left gtemporal region. The head was secured with the [place] headholder.  The patient was registered to the Stealth navigation system.  The location of the endrlying tumor was mapped.  An S-shaped curvilinear incision was planned.  A strip shave was performed. The patient was prepped and draped, and the planned incision line  was infiltrated with 0.25 Marcaine with  epinephrine. The skin incision was made. Tasneem clips were applied. Cerebellar retractors were used for retraction. Mark holes were placed using the [place]man . The craniotome was then used to elevate the bone flap. Exposed air cells were thoroughly waxed. The underlying dura was opened in cruciate fashion. Image-guidance was used to identify the margins of the tumor.  The was a confuens of large bore vessels on the surface overlying the  centerpoint of the tumor.  Our plan was thus to work inferiorly and beneath or medial to the vasculature as needed.  The vein of Zarina was identified and preserved. The fran was coagulated and incised.  Care was taken to avoid injury to surface draining veins and major arterial vessels.  Using meticulous microdissection technique, the tumor was resected to normal appearing brain tissue under microscopy in all quadrants. A gross total resection  was felt to have been achieved. All major vessels were peserved. The resection was guided using navigation to confirm the margins.  The lateral ventricle was entered and was plugged with gelfoam. Meticulous hemostasis was obtained. The tumor cavity was copiously irrigated until crystal clear and no further bleeding was evident. The dura was closed with 4-0 Neurolon in simple interrupted and running fashion. Sutured muscle pledgets were used to  reinforce the closure.  The air cells were agian waxed. An adhesive patch, Tisseal, and uDra Gen were also used to reinforce the closure. The bone flap was then replaced and rigidly affixed in place with plates and screws. The wound was copiously irrigated with an antibiotic saline solution. Meticulous hemostasis was obtained. The muscle layer and galea layer were closed with 2-0 Vicryl in interrupted fashion. The skin was closed with stainless  steel staples. A sterile dressing was applied.    The patient appeared to tolerate the procedure well. There  were no overt complications. At no time during the procedure was there any evidence of brisk bleeding. The patient was examined and noted to be at baseline neurologically. The plan was to proceed to radiology for a routine CT scan.    Kenneth Montelongo M.D.   115.3

## 2019-06-03 NOTE — DISCHARGE NOTE ADULT - RECOGNIZE DANGER SITUATIONS. FOR EXAMPLE, STRESS, DRINKING ALCOHOL, URGES TO SMOKE, SMOKING CUES, CIGARETTE AVAILABILITY
-- Message is from the [facility]--    Referral Request  Name of Specialist: Dr. Pa  Provider's specialty: Podiatry  Reason for referral: Follow Up Post Surgery / Appointment 06/04/2019    Is this a NEW request?: yes      Referral ordered by: Dr. Pa      Insurance type: n/a      Payor: [employer] / Plan: [employer] - [employer] / Product Type: [employer] Risk      Preferred Delivery Method   Call when ready for pickup - phone number to notify: [phone]        Alternative phone number: none    Turnaround time given to caller:   \"This message will be sent to [state Provider's full name]. The clinical team will return your call as soon as they review your message. Typically, it takes 3 business days to process referral requests.\"   Statement Selected

## 2019-06-18 NOTE — ED ADULT NURSE NOTE - NS ED NURSE PATIENT LEFT UNIT TIME
Neurology Follow up note    Name  YANETH QUESADA    HPI:  58F s/p R LISA (2/2019) p/w back pain which started earlier this year. Pt believed it to be related to her hip and went to see her PCP. She was diagnozed with anemia of chronic diease- further workup was done in the ER. She underwent a CT spine which demonstarted lytic lesions in spine concerning for metastatci disease. She went to see Dr. Matos for spine consultation who recommended surgery for tumor debulking and posterior spinal fusion. Currently denies numbness and tingilng. Endorses band like pain around mid-torso. No bowel or bladder incontinence. No gait imbalances. Of note, patient is a 33 year 0.5 pack smoker. (09 Jun 2019 13:07)      Interval History - back pain and radicular symptoms in the LE- no UE symptoms        REVIEW OF SYSTEMS    Vital Signs Last 24 Hrs  T(C): 37.1 (18 Jun 2019 05:15), Max: 37.6 (17 Jun 2019 20:46)  T(F): 98.7 (18 Jun 2019 05:15), Max: 99.7 (17 Jun 2019 20:46)  HR: 112 (18 Jun 2019 05:15) (105 - 122)  BP: 111/68 (18 Jun 2019 05:15) (100/62 - 131/89)  BP(mean): --  RR: 18 (18 Jun 2019 05:15) (18 - 19)  SpO2: 96% (18 Jun 2019 05:15) (95% - 99%)    Physical Exam-     Mental Status- awake and alert    Cranial Nerves- full EOM    Gait and station- n/a    Motor- no foot drop    Reflexes- decreased    Sensation- no sensory level    Coordination- no tremors    Vascular - no bruits    Medications  acetaminophen   Tablet .. 650 milliGRAM(s) Oral every 6 hours PRN  acetaminophen   Tablet .. 975 milliGRAM(s) Oral every 8 hours  albumin human  5% IVPB 250 milliLiter(s) IV Intermittent once  aluminum hydroxide/magnesium hydroxide/simethicone Suspension 30 milliLiter(s) Oral four times a day PRN  atoMOXetine 60 milliGRAM(s) Oral daily  BACItracin   Ointment 1 Application(s) Topical daily  bisacodyl Suppository 10 milliGRAM(s) Rectal daily PRN  BUpivacaine liposome 1.3% Injectable (no eMAR) 20 milliLiter(s) Local Injection once  cyclobenzaprine 5 milliGRAM(s) Oral three times a day PRN  docusate sodium 100 milliGRAM(s) Oral three times a day  famotidine    Tablet 20 milliGRAM(s) Oral every 12 hours  FIRST- Mouthwash  BLM 10 milliLiter(s) Swish and Spit every 6 hours  FLUoxetine 20 milliGRAM(s) Oral daily  heparin  Injectable 5000 Unit(s) SubCutaneous every 8 hours  HYDROmorphone  Injectable 1 milliGRAM(s) IV Push every 2 hours PRN  lidocaine   Patch 2 Patch Transdermal daily  magnesium hydroxide Suspension 30 milliLiter(s) Oral every 12 hours PRN  metoprolol succinate ER 50 milliGRAM(s) Oral daily  naloxone Injectable 0.1 milliGRAM(s) IV Push every 3 minutes PRN  ondansetron Injectable 4 milliGRAM(s) IV Push every 6 hours PRN  oxyCODONE    IR 5 milliGRAM(s) Oral every 4 hours PRN  oxyCODONE    IR 10 milliGRAM(s) Oral every 4 hours PRN  polyethylene glycol 3350 17 Gram(s) Oral daily  senna 2 Tablet(s) Oral at bedtime      Lab      Radiology    Assessment- T-L RADICULOPATHY- MYELOPATHY    Plan as per ortho and Oncology 23:43
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
## 2020-02-24 NOTE — ED ADULT TRIAGE NOTE - ESI TRIAGE ACUITY LEVEL, MLM
3 Z Plasty Text: The lesion was extirpated to the level of the fat with a #15 scalpel blade.  Given the location of the defect, shape of the defect and the proximity to free margins a Z-plasty was deemed most appropriate for repair.  Using a sterile surgical marker, the appropriate transposition arms of the Z-plasty were drawn incorporating the defect and placing the expected incisions within the relaxed skin tension lines where possible.    The area thus outlined was incised deep to adipose tissue with a #15 scalpel blade.  The skin margins were undermined to an appropriate distance in all directions utilizing iris scissors.  The opposing transposition arms were then transposed into place in opposite direction and anchored with interrupted buried subcutaneous sutures.

## 2022-07-31 ENCOUNTER — EMERGENCY (EMERGENCY)
Facility: HOSPITAL | Age: 72
LOS: 0 days | Discharge: HOME | End: 2022-07-31
Attending: STUDENT IN AN ORGANIZED HEALTH CARE EDUCATION/TRAINING PROGRAM

## 2022-07-31 VITALS
DIASTOLIC BLOOD PRESSURE: 87 MMHG | HEART RATE: 81 BPM | RESPIRATION RATE: 14 BRPM | SYSTOLIC BLOOD PRESSURE: 170 MMHG | OXYGEN SATURATION: 98 % | TEMPERATURE: 98 F

## 2022-07-31 VITALS
HEART RATE: 88 BPM | SYSTOLIC BLOOD PRESSURE: 155 MMHG | OXYGEN SATURATION: 99 % | WEIGHT: 164.91 LBS | DIASTOLIC BLOOD PRESSURE: 91 MMHG | HEIGHT: 63 IN | RESPIRATION RATE: 16 BRPM | TEMPERATURE: 98 F

## 2022-07-31 DIAGNOSIS — Z79.82 LONG TERM (CURRENT) USE OF ASPIRIN: ICD-10-CM

## 2022-07-31 DIAGNOSIS — I10 ESSENTIAL (PRIMARY) HYPERTENSION: ICD-10-CM
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
## 2022-07-31 DIAGNOSIS — E11.9 TYPE 2 DIABETES MELLITUS WITHOUT COMPLICATIONS: ICD-10-CM

## 2022-07-31 DIAGNOSIS — Z79.84 LONG TERM (CURRENT) USE OF ORAL HYPOGLYCEMIC DRUGS: ICD-10-CM
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
## 2022-07-31 DIAGNOSIS — Z87.59 PERSONAL HISTORY OF OTHER COMPLICATIONS OF PREGNANCY, CHILDBIRTH AND THE PUERPERIUM: Chronic | ICD-10-CM

## 2022-07-31 DIAGNOSIS — M25.562 PAIN IN LEFT KNEE: ICD-10-CM

## 2022-07-31 DIAGNOSIS — Z91.018 ALLERGY TO OTHER FOODS: ICD-10-CM

## 2022-07-31 PROCEDURE — 93010 ELECTROCARDIOGRAM REPORT: CPT

## 2022-07-31 PROCEDURE — 73562 X-RAY EXAM OF KNEE 3: CPT | Mod: 26,LT

## 2022-07-31 PROCEDURE — 99284 EMERGENCY DEPT VISIT MOD MDM: CPT

## 2022-07-31 RX ORDER — ATORVASTATIN CALCIUM 80 MG/1
1 TABLET, FILM COATED ORAL
Qty: 0 | Refills: 0 | DISCHARGE

## 2022-07-31 RX ORDER — METFORMIN HYDROCHLORIDE 850 MG/1
1 TABLET ORAL
Qty: 0 | Refills: 0 | DISCHARGE

## 2022-07-31 RX ORDER — ACETAMINOPHEN 500 MG
650 TABLET ORAL ONCE
Refills: 0 | Status: COMPLETED | OUTPATIENT
Start: 2022-07-31 | End: 2022-07-31

## 2022-07-31 RX ORDER — ASPIRIN/CALCIUM CARB/MAGNESIUM 324 MG
81 TABLET ORAL
Qty: 0 | Refills: 0 | DISCHARGE

## 2022-07-31 RX ORDER — LOSARTAN/HYDROCHLOROTHIAZIDE 100MG-25MG
1 TABLET ORAL
Qty: 0 | Refills: 0 | DISCHARGE

## 2022-07-31 RX ADMIN — Medication 650 MILLIGRAM(S): at 22:05

## 2022-07-31 NOTE — ED PROVIDER NOTE - PHYSICAL EXAMINATION
General: Awake, alert and oriented. No acute distress. Well developed, hydrated and nourished. Appears stated age.  Skin: Skin in warm, dry and intact without rashes or lesions. Appropriate color for ethnicity  HENMT: head normocephalic and atraumatic; bilateral external ears without swelling. no nasal discharge. moist oral mucosa. supple neck, trachea midline, no mid face instability. speaking in full sentences without issue.  EYES: Conjunctiva clear. nonicteric sclera. EOM intact, Eyelids are normal in appearance without swelling or lesions. PERRLA  Cardiac: well perfused, s1, s2, rrr  Respiratory: breathing comfortably on room air. no audible wheezing or stridor, ctab. no chest wall ecchymoses or other external signs of chest wall trauma. no chest wall ttp  Abdominal: nondistended, soft, nondistended, no seatbelt sign or other external signs of trauma  MSK: Neck and back are without deformity, visible external skin changes, or signs of trauma. Curvature of the cervical, thoracic, and lumbar spine are within normal limits. no spinal stepoffs, cervical ROM full and intact without pain or difficulty, no spinal ttp in CTLS spine. quarter sized ecchymoses tomedial aspect of left knee withminimal ttp over that area, rom of knee intact, no knee instaiblity. no other external signs of trauma. no apparent deficits in ROM of any extremity. no tenderness to any other extremities. ambulating without issue.   Neurological: The patient is awake, alert and oriented to person, place, and time with normal speech. CN 2-12 grossly intact. no apparent deficits. Memory is normal and thought process is intact. No gait abnormalities are appreciated.  Psychiatric: Appropriate mood and affect. Good judgement and insight. No visual or auditory hallucinations.

## 2022-07-31 NOTE — ED PROVIDER NOTE - PATIENT PORTAL LINK FT
You can access the FollowMyHealth Patient Portal offered by [facility] by registering at the following website: [email or web address]. By joining [facility]’s FollowMyHealth portal, you will also be able to view your health information using other applications (apps) compatible with our system.

## 2022-07-31 NOTE — ED ADULT NURSE NOTE - NSIMPLEMENTINTERV_GEN_ALL_ED
Implemented All Universal Safety Interventions:  [place] to call system. Call bell, personal items and telephone within reach. Instruct patient to call for assistance. Room bathroom lighting operational. Non-slip footwear when patient is off stretcher. Physically safe environment: no spills, clutter or unnecessary equipment. Stretcher in lowest position, wheels locked, appropriate side rails in place.
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
## 2022-07-31 NOTE — ED ADULT TRIAGE NOTE - HISTORY OF COVID-19 VACCINATION
I reviewed with the resident the medical history and the resident's findings on the physical examination. I discussed with the resident the patient's diagnosis and concur with the plan.     NO chest pain or SOB while in office today  F/u with specialists as planned  See resident note for details Yes
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
## 2023-03-21 NOTE — BRIEF OPERATIVE NOTE - CONDITION POST OP
23 yo female self referred for "GI issues". Very poor historian. Difficulty with recall.   Patient reports "I have had to see a GI doctor forever." Ongoing issues 2017 with "bad stomach problems". Have had multiple medication abortions in 2018, 2019, 2021 a few, 2022 x 4, this year x1. 2 miscarriages this year. Denies any chance pregnant, due to have implant placed at the end of April. Reports will have nausea,  no vomiting. Always bloated. No dysphagia, no odynophagia. No unintentional weight loss. Patient reports  recent ultrasound with "cyst on my stomach".   No MJ use. No hx of IVDA. Prior hx of STIs since 2017. Reports no recent infection. UTD with GYN.   No family hx of IBD or GI CA. Does admit to antibiotics use prior, no recent. No NSAIDs.   No alcohol. No melena, no rectal bleeding, normal BM. No diarrhea or constipation. Will have BM BID as per her normal. No food allergies or diet restrictions. No hx of eating d/o. Admits to depression, not on meds, no prior medication for such.   No recent labs, does not have a primary MD. No jaundice, no f/c.
Patient was seen at [facility] emergency department 5/16's/2022 complaining of chest pain and sore throat.  At that time, she had reported that her pain was due to antiretrovirals and doxycycline taken on empty stomach 3 days prior.  Laboratory studies at that time white cell count 8.2, hemoglobin 12.2, platelets 310, AST 35, ALT 45.  Chest x-ray normal chest.  Right upper quadrant ultrasound 5/17/2022 no abnormality identified. 
fair (low dose Levophed)

## 2023-05-30 NOTE — PROGRESS NOTE ADULT - SUBJECTIVE AND OBJECTIVE BOX
Patient Quality Outreach    Patient is due for the following:   Colon Cancer Screening    Next Steps:   Patient's colonoscopy is scheduled in August 2023.    Type of outreach:    Chart review performed, no outreach needed.      Questions for provider review:    None           Maria Esther Zamora         INTERVAL HPI/OVERNIGHT EVENTS: Patient off levo since yesterday. BP maintained overnight. No complaints of pain. No flatus.    Vital Signs Last 24 Hrs  T(C): 36.6 (13 May 2018 01:00), Max: 36.6 (12 May 2018 11:58)  T(F): 97.8 (13 May 2018 01:00), Max: 97.8 (12 May 2018 11:58)  HR: 71 (13 May 2018 04:00) (71 - 83)  BP: 118/63 (13 May 2018 04:00) (95/64 - 131/77)  BP(mean): 81 (13 May 2018 04:00) (74 - 94)  RR: 18 (13 May 2018 04:00) (15 - 35)  SpO2: 95% (13 May 2018 04:00) (92% - 100%)    MEDICATIONS  (STANDING):  aspirin  chewable 81 milliGRAM(s) Oral daily  chlorhexidine 4% Liquid 1 Application(s) Topical <User Schedule>  dextrose 5%. 1000 milliLiter(s) (50 mL/Hr) IV Continuous <Continuous>  dextrose 50% Injectable 12.5 Gram(s) IV Push once  dextrose 50% Injectable 25 Gram(s) IV Push once  dextrose 50% Injectable 25 Gram(s) IV Push once  heparin  Injectable 5000 Unit(s) SubCutaneous every 12 hours  insulin lispro (HumaLOG) corrective regimen sliding scale   SubCutaneous every 6 hours  pantoprazole  Injectable 40 milliGRAM(s) IV Push daily  piperacillin/tazobactam IVPB. 3.375 Gram(s) IV Intermittent every 8 hours  potassium chloride  10 mEq/100 mL IVPB 10 milliEquivalent(s) IV Intermittent every 1 hour  potassium phosphate IVPB 15 milliMole(s) IV Intermittent once  sodium chloride 0.45%. 1000 milliLiter(s) (125 mL/Hr) IV Continuous <Continuous>    MEDICATIONS  (PRN):  acetaminophen  Suppository 650 milliGRAM(s) Rectal every 6 hours PRN For Temp greater than 38 C (100.4 F)  acetaminophen  Suppository. 650 milliGRAM(s) Rectal every 6 hours PRN Mild Pain (1 - 3)  glucagon  Injectable 1 milliGRAM(s) IntraMuscular once PRN Glucose LESS THAN 70 milligrams/deciliter  morphine  - Injectable 2 milliGRAM(s) IV Push every 4 hours PRN Moderate Pain (4 - 6)  ondansetron Injectable 4 milliGRAM(s) IV Push every 6 hours PRN Nausea      PHYSICAL EXAM    GENERAL: AAO in NAD  CHEST/LUNG: No respiratory distress  ABDOMEN: Soft, NTND. Prevena in place with good seal. NGT in place.    I&O:  I&O's Detail    12 May 2018 07:01  -  13 May 2018 07:00  --------------------------------------------------------  IN:    dextrose 5% + sodium chloride 0.9%: 500 mL    IV PiggyBack: 100 mL    sodium chloride 0.45%.: 875 mL  Total IN: 1475 mL    OUT:    Indwelling Catheter - Urethral: 180 mL    Voided: 400 mL  Total OUT: 580 mL    Total NET: 895 mL          LABS:                        11.3   7.55  )-----------( 199      ( 13 May 2018 04:12 )             35.3     05-13    148<H>  |  116<H>  |  8   ----------------------------<  176<H>  4.1   |  27  |  0.72    Ca    8.1<L>      13 May 2018 04:12  Phos  1.6     05-13  Mg     2.2     05-13      PT/INR - ( 11 May 2018 09:28 )   PT: 10.2 sec;   INR: 0.94 ratio         PTT - ( 11 May 2018 09:28 )  PTT:23.3 sec    Culture Results:   Culture grew 3 or more types of organisms which indicate  collection contamination; consider recollection only if clinically  indicated. (05-10 @ 08:37)      Impression: 67F POD 2 s/p 2nd look laparotomy and anastomosis of SB. Stable and downgraded from ICU    Plan:  Await GI function. Keep NGT for now.  OOB  Pain control prn  Puentes

## 2024-05-07 ENCOUNTER — NON-APPOINTMENT (OUTPATIENT)
Age: 74
End: 2024-05-07

## 2024-05-10 ENCOUNTER — EMERGENCY (EMERGENCY)
Facility: HOSPITAL | Age: 74
LOS: 0 days | Discharge: HOME | End: 2024-05-10
Attending: EMERGENCY MEDICINE
Payer: MEDICARE

## 2024-05-10 VITALS
RESPIRATION RATE: 18 BRPM | HEART RATE: 89 BPM | WEIGHT: 164.91 LBS | TEMPERATURE: 98 F | HEIGHT: 62 IN | OXYGEN SATURATION: 99 % | SYSTOLIC BLOOD PRESSURE: 177 MMHG | DIASTOLIC BLOOD PRESSURE: 76 MMHG

## 2024-05-10 DIAGNOSIS — Z87.59 PERSONAL HISTORY OF OTHER COMPLICATIONS OF PREGNANCY, CHILDBIRTH AND THE PUERPERIUM: Chronic | ICD-10-CM

## 2024-05-10 DIAGNOSIS — E11.9 TYPE 2 DIABETES MELLITUS WITHOUT COMPLICATIONS: ICD-10-CM

## 2024-05-10 DIAGNOSIS — R22.0 LOCALIZED SWELLING, MASS AND LUMP, HEAD: ICD-10-CM

## 2024-05-10 DIAGNOSIS — E78.5 HYPERLIPIDEMIA, UNSPECIFIED: ICD-10-CM

## 2024-05-10 DIAGNOSIS — Z79.84 LONG TERM (CURRENT) USE OF ORAL HYPOGLYCEMIC DRUGS: ICD-10-CM

## 2024-05-10 DIAGNOSIS — I10 ESSENTIAL (PRIMARY) HYPERTENSION: ICD-10-CM

## 2024-05-10 DIAGNOSIS — Z79.82 LONG TERM (CURRENT) USE OF ASPIRIN: ICD-10-CM

## 2024-05-10 DIAGNOSIS — Z91.018 ALLERGY TO OTHER FOODS: ICD-10-CM

## 2024-05-10 DIAGNOSIS — K13.0 DISEASES OF LIPS: ICD-10-CM

## 2024-05-10 PROCEDURE — 99284 EMERGENCY DEPT VISIT MOD MDM: CPT

## 2024-05-10 RX ORDER — AMPICILLIN SODIUM AND SULBACTAM SODIUM 250; 125 MG/ML; MG/ML
3 INJECTION, POWDER, FOR SUSPENSION INTRAMUSCULAR; INTRAVENOUS ONCE
Refills: 0 | Status: DISCONTINUED | OUTPATIENT
Start: 2024-05-10 | End: 2024-05-10

## 2024-05-10 NOTE — ED ADULT TRIAGE NOTE - HEIGHT IN CM
If you are a smoker, it is important for your health to stop smoking. Please be aware that second hand smoke is also harmful.
157.48

## 2024-05-10 NOTE — ED PROVIDER NOTE - ENMT, MLM
Airway patent, Nasal mucosa clear. Mouth with upper lip and gum swelling noted. Throat has no vesicles, no oropharyngeal exudates and uvula is midline.

## 2024-05-10 NOTE — ED PROVIDER NOTE - CLINICAL SUMMARY MEDICAL DECISION MAKING FREE TEXT BOX
Pt offered further testing and CT of upper lip abscess likely secondary to gum infection- pt states would not like any further testing and came in at behest of her family members - pt states feeling fine now and would not like any further labs or CT and will continue Augmentin as instructed and told to do mouthwash and rinses 2-3 times daily and have Dental follow up in near future -understands that if lip swelling worsens will need to return to ED for further care.

## 2024-05-10 NOTE — ED PROVIDER NOTE - OBJECTIVE STATEMENT
73 year old female with PMH of HTN, DM II and HLD otherwise seen and given augmentin from urgent care 2 days ago - otherwise lip swelling is still present and not worsening but also not improving much. No fever/chills and states she had some gum discomfort prior to lip swelling developing.

## 2024-05-10 NOTE — ED ADULT TRIAGE NOTE - CHIEF COMPLAINT QUOTE
c/o abscess/swelling above mouth x4 days, pt reports went to urgent care and started abx tx. Denies fever/chills. PMH DM, HTN, HDL.

## 2024-05-10 NOTE — ED PROVIDER NOTE - PATIENT PORTAL LINK FT
You can access the FollowMyHealth Patient Portal offered by [facility] by registering at the following website: [email or web address]. By joining [employer]’s FollowMyHealth portal, you will also be able to view your health information using other applications (apps) compatible with our system.

## 2024-05-10 NOTE — ED PROVIDER NOTE - NSFOLLOWUPINSTRUCTIONS_ED_ALL_ED_FT
Dental Abscess  Cross-sectional view of two teeth: one tooth is normal and the other tooth has an abscess.  A dental abscess is an infection around a tooth that may involve pain, swelling, and a collection of pus, as well as other symptoms. Treatment is important to help with symptoms and to prevent the infection from spreading.    The general types of dental abscesses are:  Pulpal abscess. This abscess may form from the inner part of the tooth (pulp).  Periodontal abscess. This abscess may form from the gum.  What are the causes?  This condition is caused by a bacterial infection in or around the tooth. It may result from:  Severe tooth decay (cavities).  Trauma to the tooth, such as a broken or chipped tooth.  What increases the risk?  This condition is more likely to develop in males. It is also more likely to develop in people who:  Have cavities.  Have severe gum disease.  Eat sugary snacks between meals.  Use tobacco products.  Have diabetes.  Have a weakened disease-fighting system (immune system).  Do not brush and care for their teeth regularly.  What are the signs or symptoms?  Mild symptoms of this condition include:  Tenderness.  Bad breath.  Fever.  A bitter taste in the mouth.  Pain in and around the infected tooth.  Moderate symptoms of this condition include:  Swollen neck glands.  Chills.  Pus drainage.  Swelling and redness around the infected tooth, in the mouth, or in the face.  Severe pain in and around the infected tooth.  Severe symptoms of this condition include:  Difficulty swallowing.  Difficulty opening the mouth.  Nausea.  Vomiting.  How is this diagnosed?  This condition is diagnosed based on:  Your symptoms and your medical and dental history.  An examination of the infected tooth. During the exam, your dental care provider may tap on the infected tooth.  You may also need to have X-rays taken of the affected area.    How is this treated?  This condition is treated by getting rid of the infection. This may be done with:  Antibiotic medicines. These may be used in certain situations.  Antibacterial mouth rinse.  Incision and drainage. This procedure is done by making an incision in the abscess to drain out the pus. Removing pus is the first priority in treating an abscess.  A root canal. This may be performed to save the tooth. Your dental care provider accesses the visible part of your tooth (crown) with a drill and removes any infected pulp. Then the space is filled and sealed off.  Tooth extraction. The tooth is pulled out if it cannot be saved by other treatment.  You may also receive treatment for pain, such as:  Acetaminophen or NSAIDs.  Gels that contain a numbing medicine.  An injection to block the pain near your nerve.  Follow these instructions at home:  Medicines    Take over-the-counter and prescription medicines only as told by your dental care provider.  If you were prescribed an antibiotic, take it as told by your dental care provider. Do not stop taking the antibiotic even if you start to feel better.  If you were prescribed a gel that contains a numbing medicine, use it exactly as told in the directions. Do not use these gels for children who are younger than 2 years of age.  Use an antibacterial mouth rinse as told by your dental care provider.  General instructions    A do not smoke cigarettes sign.   Gargle with a mixture of salt and water 3–4 times a day or as needed. To make salt water, completely dissolve ½–1 tsp (3–6 g) of salt in 1 cup (237 mL) of warm water.  Eat a soft diet while your abscess is healing.  Drink enough fluid to keep your urine pale yellow.  Do not apply heat to the outside of your mouth.  Do not use any products that contain nicotine or tobacco. These products include cigarettes, chewing tobacco, and vaping devices, such as e-cigarettes. If you need help quitting, ask your dental care provider.  Keep all follow-up visits. This is important.  How is this prevented?  How to use dental floss.  Excellent dental home care, which includes brushing your teeth every morning and night with fluoride toothpaste. Floss one time each day.  Get regularly scheduled dental cleanings.  Consider having a dental sealant applied on teeth that have deep grooves to prevent cavities.  Drink fluoridated water regularly. This includes most tap water. Check the label on bottled water to see if it contains fluoride.  Reduce or eliminate sugary drinks.  Eat healthy meals and snacks.  Wear a mouth guard or face shield to protect your teeth while playing sports.  Contact a health care provider if:  Your pain is worse and is not helped by medicine.  You have swelling.  You see pus around the tooth.  You have a fever or chills.  Get help right away if:  Your symptoms suddenly get worse.  You have a very bad headache.  You have problems breathing or swallowing.  You have trouble opening your mouth.  You have swelling in your neck or around your eye.  These symptoms may represent a serious problem that is an emergency. Do not wait to see if the symptoms will go away. Get medical help right away. Call your local emergency services (911 in the [place] Do not drive yourself to the hospital.    Summary  A dental abscess is a collection of pus in or around a tooth that results from an infection.  A dental abscess may result from severe tooth decay, trauma to the tooth, or severe gum disease around a tooth.  Symptoms include severe pain, swelling, redness, and drainage of pus in and around the infected tooth.  The first priority in treating a dental abscess is to drain out the pus. Treatment may also involve removing damage inside the tooth (root canal) or extracting the tooth.  This information is not intended to replace advice given to you by your health care provider. Make sure you discuss any questions you have with your health care provider.

## 2024-05-10 NOTE — ED ADULT NURSE NOTE - RESPIRATORY ASSESSMENT
Advanced Care Planning Note. Purpose of Encounter: Advanced care planning in light of closed right rib fracture  Parties In Attendance: Patient  Decisional Capacity: Yes  Subjective: Patient with tolerable right rib pain  Objective: Cr 0.8  Goals of Care Determination: Patient wants full support (CPR, vent, surgery, HD, trach, PEG)  Plan:  IV Toradol, PT/OT  Code Status: Full code   Time spent on Advanced care Plannin minutes  Advanced Care Planning Documents: Completed advanced directives on chart, Sheeba Mack is the [address].     Sanjay Sanchez MD  2023 6:07 PM - - -

## 2024-05-10 NOTE — ED ADULT TRIAGE NOTE - SPO2 (%)
Bedside and verbal shift report given by Lenore Hicks RN (off going nurse) to Leticia Arzola RN (oncoming nurse). Report included the following information SBAR and ED Summary. 99

## 2024-05-10 NOTE — ED ADULT NURSE NOTE - OBJECTIVE STATEMENT
as per patient " abscess to the inside of the top mouth x 4 days, this is the 2nd episode. Started taking antibiotics 2 days ago and swelling hasn't gone down. Denies fever"

## 2024-05-10 NOTE — ED PROVIDER NOTE - NSFOLLOWUPCLINICS_GEN_ALL_ED_FT
[facility]  Dental  [address] [place], [place] [id number]  Phone: [phone]  Fax:   Follow Up Time: 1-3 Days

## 2025-03-26 ENCOUNTER — NON-APPOINTMENT (OUTPATIENT)
Age: 75
End: 2025-03-26